# Patient Record
Sex: MALE | Race: WHITE | NOT HISPANIC OR LATINO
[De-identification: names, ages, dates, MRNs, and addresses within clinical notes are randomized per-mention and may not be internally consistent; named-entity substitution may affect disease eponyms.]

---

## 2019-08-19 ENCOUNTER — APPOINTMENT (OUTPATIENT)
Dept: CARDIOLOGY | Facility: CLINIC | Age: 49
End: 2019-08-19
Payer: MEDICARE

## 2019-08-19 ENCOUNTER — NON-APPOINTMENT (OUTPATIENT)
Age: 49
End: 2019-08-19

## 2019-08-19 VITALS
HEART RATE: 81 BPM | HEIGHT: 67 IN | OXYGEN SATURATION: 95 % | TEMPERATURE: 98.7 F | DIASTOLIC BLOOD PRESSURE: 67 MMHG | BODY MASS INDEX: 26.68 KG/M2 | WEIGHT: 170 LBS | RESPIRATION RATE: 12 BRPM | SYSTOLIC BLOOD PRESSURE: 118 MMHG

## 2019-08-19 DIAGNOSIS — N40.0 BENIGN PROSTATIC HYPERPLASIA WITHOUT LOWER URINARY TRACT SYMPMS: ICD-10-CM

## 2019-08-19 DIAGNOSIS — H40.9 UNSPECIFIED GLAUCOMA: ICD-10-CM

## 2019-08-19 PROCEDURE — 93000 ELECTROCARDIOGRAM COMPLETE: CPT

## 2019-08-19 PROCEDURE — 99204 OFFICE O/P NEW MOD 45 MIN: CPT

## 2019-08-19 RX ORDER — PILOCARPINE HYDROCHLORIDE OPHTHALMIC SOLUTION 40 MG/ML
4 SOLUTION/ DROPS OPHTHALMIC
Refills: 0 | Status: ACTIVE | COMMUNITY

## 2019-08-19 RX ORDER — ARTIFICIAL TEARS 1; 2; 3 MG/ML; MG/ML; MG/ML
SOLUTION/ DROPS OPHTHALMIC
Refills: 0 | Status: ACTIVE | COMMUNITY

## 2019-08-19 RX ORDER — TERAZOSIN HCL 1 MG
1 TABLET ORAL
Refills: 0 | Status: ACTIVE | COMMUNITY

## 2019-08-19 RX ORDER — LISINOPRIL 40 MG/1
40 TABLET ORAL
Refills: 0 | Status: ACTIVE | COMMUNITY

## 2019-08-19 RX ORDER — DORZOLAMIDE HYDROCHLORIDE TIMOLOL MALEATE 20; 5 MG/ML; MG/ML
22.3-6.8 SOLUTION/ DROPS OPHTHALMIC
Refills: 0 | Status: ACTIVE | COMMUNITY

## 2019-08-19 RX ORDER — SIMVASTATIN 40 MG/1
40 TABLET, FILM COATED ORAL
Refills: 0 | Status: ACTIVE | COMMUNITY

## 2019-08-19 RX ORDER — LATANOPROST/PF 0.005 %
0.01 DROPS OPHTHALMIC (EYE)
Refills: 0 | Status: ACTIVE | COMMUNITY

## 2019-08-19 RX ORDER — CHOLECALCIFEROL (VITAMIN D3) 1250 MCG
1.25 MG CAPSULE ORAL
Refills: 0 | Status: ACTIVE | COMMUNITY

## 2019-08-19 RX ORDER — METFORMIN HYDROCHLORIDE 500 MG/1
500 TABLET, COATED ORAL
Refills: 0 | Status: ACTIVE | COMMUNITY

## 2019-08-19 NOTE — HISTORY OF PRESENT ILLNESS
[FreeTextEntry1] : Joselyn is an 78-year-old gentleman from [place] here today with son for evaluation of dizziness. He has cochlear implant, glaucome, DM, htn, hyperlipidemia, BPH who started having dizziness before he went to [place] for vacation. He went to MD there and told to be hypertensive. Medications were not changed as far as son knows. Recently started seeing Dr. Silva.

## 2019-08-19 NOTE — DISCUSSION/SUMMARY
[___ Week(s)] : [unfilled] week(s) [FreeTextEntry1] : The patient is an 78-year-old gentleman from [place], Diabetes mellitus, hypertension, hyperlipidemia, BPH, glaucoma with dizziness.\par #1 CV- no angina or heart failure, ECHO ordered\par #2 Htn- BP too low, taper off HCTZ, continue lisinopril\par #3 Lipids- on atorvastatin\par #4 DM- on metformin, obtain labs\par #5 BPH- no change to terazosin\par #6 ENT- cochlear implant for many years\par #7 Glaucoma- meds have been stable, consider holter to evaluate for bradycardia on timolol, however, HR normal today\par #8 General- anxiety may be playing a role, sounds like he has white coat hypertension as well

## 2019-08-19 NOTE — PHYSICAL EXAM
[General Appearance - Well Developed] : well developed [Normal Appearance] : normal appearance [Well Groomed] : well groomed [General Appearance - Well Nourished] : well nourished [No Deformities] : no deformities [General Appearance - In No Acute Distress] : no acute distress [Eyelids - No Xanthelasma] : the eyelids demonstrated no xanthelasmas [Normal Conjunctiva] : the conjunctiva exhibited no abnormalities [Normal Oral Mucosa] : normal oral mucosa [No Oral Pallor] : no oral pallor [No Oral Cyanosis] : no oral cyanosis [Normal Jugular Venous A Waves Present] : normal jugular venous A waves present [Normal Jugular Venous V Waves Present] : normal jugular venous V waves present [Heart Rate And Rhythm] : heart rate and rhythm were normal [No Jugular Venous Mathews A Waves] : no jugular venous mathews A waves [Heart Sounds] : normal S1 and S2 [Murmurs] : no murmurs present [Respiration, Rhythm And Depth] : normal respiratory rhythm and effort [Exaggerated Use Of Accessory Muscles For Inspiration] : no accessory muscle use [Auscultation Breath Sounds / Voice Sounds] : lungs were clear to auscultation bilaterally [Abdomen Soft] : soft [Abdomen Tenderness] : non-tender [Abnormal Walk] : normal gait [Abdomen Mass (___ Cm)] : no abdominal mass palpated [Gait - Sufficient For Exercise Testing] : the gait was sufficient for exercise testing [Nail Clubbing] : no clubbing of the fingernails [Cyanosis, Localized] : no localized cyanosis [Petechial Hemorrhages (___cm)] : no petechial hemorrhages [Skin Color & Pigmentation] : normal skin color and pigmentation [] : no rash [No Venous Stasis] : no venous stasis [No Skin Ulcers] : no skin ulcer [Skin Lesions] : no skin lesions [Oriented To Time, Place, And Person] : oriented to person, place, and time [No Xanthoma] : no  xanthoma was observed [Affect] : the affect was normal [Mood] : the mood was normal [No Anxiety] : not feeling anxious

## 2019-08-19 NOTE — REVIEW OF SYSTEMS
[Shortness Of Breath] : shortness of breath [Chest Pain] : chest pain [Dyspnea on exertion] : dyspnea during exertion [Lower Ext Edema] : lower extremity edema [Palpitations] : palpitations [Negative] : Heme/Lymph

## 2019-09-05 ENCOUNTER — APPOINTMENT (OUTPATIENT)
Dept: CARDIOLOGY | Facility: CLINIC | Age: 49
End: 2019-09-05
Payer: MEDICARE

## 2019-09-05 VITALS
HEART RATE: 69 BPM | HEIGHT: 67 IN | DIASTOLIC BLOOD PRESSURE: 79 MMHG | OXYGEN SATURATION: 98 % | TEMPERATURE: 97.9 F | RESPIRATION RATE: 12 BRPM | SYSTOLIC BLOOD PRESSURE: 148 MMHG

## 2019-09-05 PROCEDURE — 93306 TTE W/DOPPLER COMPLETE: CPT

## 2019-09-05 PROCEDURE — 99214 OFFICE O/P EST MOD 30 MIN: CPT

## 2019-09-05 RX ORDER — HYDROCHLOROTHIAZIDE 25 MG/1
25 TABLET ORAL
Refills: 0 | Status: DISCONTINUED | COMMUNITY
End: 2019-09-05

## 2019-09-05 NOTE — DISCUSSION/SUMMARY
[___ Month(s)] : [unfilled] month(s) [FreeTextEntry1] : The patient is an 78-year-old gentleman from [place], Diabetes mellitus, hypertension, hyperlipidemia, BPH, glaucoma with dizziness that improved off HCTZ.\par #1 CV- no angina or heart failure, ECHO results pending\par #2 Htn- continue lisinopril only\par #3 Lipids- on atorvastatin\par #4 DM- on metformin, obtain labs\par #5 BPH- no change to terazosin\par #6 ENT- cochlear implant for many years\par #7 Glaucoma- meds have been stable\par #8 General- anxiety may be playing a role, sounds like he has white coat hypertension as well

## 2019-09-05 NOTE — HISTORY OF PRESENT ILLNESS
[FreeTextEntry1] : Joselyn is an 78-year-old gentleman from [place] here today with son for evaluation of dizziness. He has cochlear implant, glaucoma, DM, htn, hyperlipidemia, BPH. On questioning, feels better with less dizziness off HCTZ. Only complain is insomnia.

## 2019-09-05 NOTE — PHYSICAL EXAM
[General Appearance - Well Developed] : well developed [Normal Appearance] : normal appearance [Well Groomed] : well groomed [General Appearance - Well Nourished] : well nourished [No Deformities] : no deformities [General Appearance - In No Acute Distress] : no acute distress [Normal Conjunctiva] : the conjunctiva exhibited no abnormalities [Eyelids - No Xanthelasma] : the eyelids demonstrated no xanthelasmas [Normal Oral Mucosa] : normal oral mucosa [No Oral Pallor] : no oral pallor [No Oral Cyanosis] : no oral cyanosis [Normal Jugular Venous A Waves Present] : normal jugular venous A waves present [No Jugular Venous Mathews A Waves] : no jugular venous mathews A waves [Normal Jugular Venous V Waves Present] : normal jugular venous V waves present [Respiration, Rhythm And Depth] : normal respiratory rhythm and effort [Exaggerated Use Of Accessory Muscles For Inspiration] : no accessory muscle use [Auscultation Breath Sounds / Voice Sounds] : lungs were clear to auscultation bilaterally [Heart Rate And Rhythm] : heart rate and rhythm were normal [Heart Sounds] : normal S1 and S2 [Murmurs] : no murmurs present [Abdomen Soft] : soft [Abdomen Tenderness] : non-tender [Abdomen Mass (___ Cm)] : no abdominal mass palpated [Abnormal Walk] : normal gait [Gait - Sufficient For Exercise Testing] : the gait was sufficient for exercise testing [Nail Clubbing] : no clubbing of the fingernails [Cyanosis, Localized] : no localized cyanosis [Petechial Hemorrhages (___cm)] : no petechial hemorrhages [Skin Color & Pigmentation] : normal skin color and pigmentation [] : no rash [No Venous Stasis] : no venous stasis [Skin Lesions] : no skin lesions [No Skin Ulcers] : no skin ulcer [No Xanthoma] : no  xanthoma was observed [Oriented To Time, Place, And Person] : oriented to person, place, and time [Affect] : the affect was normal [Mood] : the mood was normal [No Anxiety] : not feeling anxious

## 2019-09-05 NOTE — REVIEW OF SYSTEMS
[Shortness Of Breath] : shortness of breath [Dyspnea on exertion] : dyspnea during exertion [Chest Pain] : chest pain [Lower Ext Edema] : lower extremity edema [Palpitations] : palpitations [Negative] : Heme/Lymph

## 2019-12-05 ENCOUNTER — APPOINTMENT (OUTPATIENT)
Dept: CARDIOLOGY | Facility: CLINIC | Age: 49
End: 2019-12-05

## 2020-01-29 ENCOUNTER — APPOINTMENT (OUTPATIENT)
Dept: CARDIOLOGY | Facility: CLINIC | Age: 50
End: 2020-01-29
Payer: MEDICARE

## 2020-01-29 ENCOUNTER — NON-APPOINTMENT (OUTPATIENT)
Age: 50
End: 2020-01-29

## 2020-01-29 VITALS
RESPIRATION RATE: 14 BRPM | BODY MASS INDEX: 26.21 KG/M2 | HEIGHT: 67 IN | WEIGHT: 167 LBS | SYSTOLIC BLOOD PRESSURE: 166 MMHG | OXYGEN SATURATION: 98 % | HEART RATE: 84 BPM | DIASTOLIC BLOOD PRESSURE: 73 MMHG

## 2020-01-29 VITALS — DIASTOLIC BLOOD PRESSURE: 78 MMHG | SYSTOLIC BLOOD PRESSURE: 130 MMHG

## 2020-01-29 DIAGNOSIS — R42 DIZZINESS AND GIDDINESS: ICD-10-CM

## 2020-01-29 DIAGNOSIS — E11.9 TYPE 2 DIABETES MELLITUS W/OUT COMPLICATIONS: ICD-10-CM

## 2020-01-29 DIAGNOSIS — I10 ESSENTIAL (PRIMARY) HYPERTENSION: ICD-10-CM

## 2020-01-29 DIAGNOSIS — E78.5 HYPERLIPIDEMIA, UNSPECIFIED: ICD-10-CM

## 2020-01-29 PROCEDURE — 99214 OFFICE O/P EST MOD 30 MIN: CPT

## 2020-01-29 RX ORDER — ASPIRIN ENTERIC COATED TABLETS 81 MG 81 MG/1
81 TABLET, DELAYED RELEASE ORAL
Qty: 90 | Refills: 3 | Status: ACTIVE | COMMUNITY
Start: 1900-01-01 | End: 1900-01-01

## 2020-01-29 NOTE — HISTORY OF PRESENT ILLNESS
[FreeTextEntry1] : Joselyn is an 79-year-old gentleman from [place] here today with son. He has cochlear implant, glaucoma, DM, htn, hyperlipidemia, BPH. He continues to have some dizziness but very mild. He is not walking much because of the cold weather. He wants to be on aspirin.

## 2020-01-29 NOTE — PHYSICAL EXAM
[Normal Appearance] : normal appearance [General Appearance - Well Developed] : well developed [General Appearance - Well Nourished] : well nourished [Well Groomed] : well groomed [No Deformities] : no deformities [Normal Conjunctiva] : the conjunctiva exhibited no abnormalities [Eyelids - No Xanthelasma] : the eyelids demonstrated no xanthelasmas [General Appearance - In No Acute Distress] : no acute distress [Normal Oral Mucosa] : normal oral mucosa [No Oral Pallor] : no oral pallor [Normal Jugular Venous A Waves Present] : normal jugular venous A waves present [No Oral Cyanosis] : no oral cyanosis [Normal Jugular Venous V Waves Present] : normal jugular venous V waves present [No Jugular Venous Mathews A Waves] : no jugular venous mathews A waves [Respiration, Rhythm And Depth] : normal respiratory rhythm and effort [Exaggerated Use Of Accessory Muscles For Inspiration] : no accessory muscle use [Heart Rate And Rhythm] : heart rate and rhythm were normal [Auscultation Breath Sounds / Voice Sounds] : lungs were clear to auscultation bilaterally [Heart Sounds] : normal S1 and S2 [Murmurs] : no murmurs present [Abdomen Soft] : soft [Abdomen Tenderness] : non-tender [Abdomen Mass (___ Cm)] : no abdominal mass palpated [Gait - Sufficient For Exercise Testing] : the gait was sufficient for exercise testing [Abnormal Walk] : normal gait [Nail Clubbing] : no clubbing of the fingernails [Cyanosis, Localized] : no localized cyanosis [Petechial Hemorrhages (___cm)] : no petechial hemorrhages [Skin Color & Pigmentation] : normal skin color and pigmentation [No Venous Stasis] : no venous stasis [] : no rash [Skin Lesions] : no skin lesions [No Xanthoma] : no  xanthoma was observed [No Skin Ulcers] : no skin ulcer [Oriented To Time, Place, And Person] : oriented to person, place, and time [Affect] : the affect was normal [Mood] : the mood was normal [No Anxiety] : not feeling anxious

## 2020-01-29 NOTE — REVIEW OF SYSTEMS
[Dyspnea on exertion] : dyspnea during exertion [Negative] : Heme/Lymph [Shortness Of Breath] : no shortness of breath [Chest Pain] : no chest pain [Palpitations] : no palpitations [Lower Ext Edema] : no extremity edema

## 2020-01-29 NOTE — DISCUSSION/SUMMARY
[___ Month(s)] : [unfilled] month(s) [FreeTextEntry1] : The patient is an 79-year-old gentleman from [place], Diabetes mellitus, hypertension, hyperlipidemia, BPH, glaucoma who is deconditioned.\par #1 CV- no angina or heart failure, ECHO LV[facility]\par #2 Htn- continue lisinopril only\par #3 Lipids- on atorvastatin\par #4 DM- on metformin, obtain labs\par #5 BPH- no change to terazosin\par #6 ENT- cochlear implant for many years\par #7 Glaucoma- meds have been stable\par #8 General- deconditioned, discussed walking once an hour in the house

## 2020-06-29 ENCOUNTER — APPOINTMENT (OUTPATIENT)
Dept: CARDIOLOGY | Facility: CLINIC | Age: 50
End: 2020-06-29

## 2021-01-07 ENCOUNTER — EMERGENCY (EMERGENCY)
Facility: HOSPITAL | Age: 51
LOS: 1 days | Discharge: HOME | End: 2021-01-07
Attending: EMERGENCY MEDICINE
Payer: COMMERCIAL

## 2021-01-07 VITALS
HEART RATE: 111 BPM | SYSTOLIC BLOOD PRESSURE: 117 MMHG | DIASTOLIC BLOOD PRESSURE: 82 MMHG | OXYGEN SATURATION: 94 % | RESPIRATION RATE: 18 BRPM | TEMPERATURE: 100 F

## 2021-01-07 VITALS
RESPIRATION RATE: 18 BRPM | OXYGEN SATURATION: 95 % | DIASTOLIC BLOOD PRESSURE: 76 MMHG | HEART RATE: 98 BPM | SYSTOLIC BLOOD PRESSURE: 124 MMHG

## 2021-01-07 LAB — SARS-COV-2 RNA SPEC QL NAA+PROBE: DETECTED

## 2021-01-07 PROCEDURE — 93010 ELECTROCARDIOGRAM REPORT: CPT

## 2021-01-07 PROCEDURE — U0005: CPT

## 2021-01-07 PROCEDURE — 99284 EMERGENCY DEPT VISIT MOD MDM: CPT

## 2021-01-07 PROCEDURE — 87635 SARS-COV-2 COVID-19 AMP PRB: CPT

## 2021-01-07 PROCEDURE — 93005 ELECTROCARDIOGRAM TRACING: CPT

## 2021-01-07 PROCEDURE — 99283 EMERGENCY DEPT VISIT LOW MDM: CPT

## 2021-01-07 RX ORDER — SODIUM CHLORIDE 9 MG/ML
1000 INJECTION INTRAMUSCULAR; INTRAVENOUS; SUBCUTANEOUS ONCE
Refills: 0 | Status: DISCONTINUED | OUTPATIENT
Start: 2021-01-07 | End: 2021-01-07

## 2021-01-07 RX ORDER — IBUPROFEN 200 MG
400 TABLET ORAL ONCE
Refills: 0 | Status: COMPLETED | OUTPATIENT
Start: 2021-01-07 | End: 2021-01-07

## 2021-01-07 RX ORDER — ACETAMINOPHEN 500 MG
650 TABLET ORAL ONCE
Refills: 0 | Status: COMPLETED | OUTPATIENT
Start: 2021-01-07 | End: 2021-01-07

## 2021-01-07 RX ADMIN — Medication 100 MILLIGRAM(S): at 16:05

## 2021-01-07 RX ADMIN — Medication 650 MILLIGRAM(S): at 16:00

## 2021-01-07 RX ADMIN — Medication 400 MILLIGRAM(S): at 17:10

## 2021-01-07 NOTE — ED ADULT NURSE NOTE - OBJECTIVE STATEMENT
49 yo m no pertinent pmhx presents to the ED with c/o fevers, chills, SOB, +covid parents. PT reports he lives at home with his parents and both are covid positive. Reports he has been experiencing fevers and chills which then progressed to SOB. PT states he noticed the fevers and chills started a couple of days ago, has been experiencing myalgia as well as fevers and chills, increased coughing, and noticed DEAN. Pt denies headache, dizziness, chest pain, palpitations,  abdominal pain, n/v/d, urinary symptoms 51 yo m no pertinent pmhx presents to the ED with c/o fevers, chills, SOB, +covid parents. PT reports he lives at home with his parents and both are covid positive. Reports he has been experiencing fevers and chills which then progressed to SOB. PT states he noticed the fevers and chills started a couple of days ago, has been experiencing myalgia as well as fevers and chills, increased coughing, and noticed DEAN, pt also reports Gi disturbances, reports diarrhea.

## 2021-01-07 NOTE — ED PROVIDER NOTE - OBJECTIVE STATEMENT
51 yo M w/ no reported PMH w/ multiple cohabitants currently COVID+, presenting for 10 days of cough, congestion, fevers (max 102), and SOB. Associated intermittent diarrhea and decreased PO intake. Denies CP, syncope/lightheadedness, N/V, abd pain, changes in urination, and blood in stool. No hx of smoking. Pt denies regular medication use. Had Tylenol today AM w/ mild relief. 51 yo M w/ no reported PMH w/ multiple cohabitants currently COVID+, presenting for 10 days of cough, congestion, fevers (max 102), and SOB. Associated intermittent diarrhea and decreased PO intake. Denies sob, CP, syncope/lightheadedness, N/V, abd pain, changes in urination, and blood in stool. No hx of smoking. Pt denies regular medication use. Had Tylenol today AM w/ mild relief.

## 2021-01-07 NOTE — ED PROVIDER NOTE - PATIENT PORTAL LINK FT
You can access the FollowMyHealth Patient Portal offered by [facility] by registering at the following website: [email or web address]. By joining [employer]’s FollowMyHealth portal, you will also be able to view your health information using other applications (apps) compatible with our system.

## 2021-01-07 NOTE — ED PROVIDER NOTE - PHYSICAL EXAMINATION
Gen: A&Ox4.   HEENT: Atraumatic. No pharyngeal erythema or exudates. Mucous membranes moist, no scleral icterus.   CV: Intermittently tachycardic. No M/R/G. No significant LE edema. Distal pulses intact. Capillary refill < 2 seconds.  Resp: Normal effort. CTAB, no rales, rhonchi, or wheezes.  GI: Abdomen non tender to palpation, soft and non-distended. No masses appreciated. + BS.  MSK: No open wounds. No ecchymosis appreciated.  Neuro: Following commands. Moving extremities spontaneously.  Psych: Appropriate mood, cooperative

## 2021-01-07 NOTE — ED PROVIDER NOTE - PROGRESS NOTE DETAILS
Feels better. HR improved. Medicated for fever. +COVID, supportive care, low risk. F/u with pmd. Return precautions discussed

## 2021-01-07 NOTE — ED PROVIDER NOTE - CLINICAL SUMMARY MEDICAL DECISION MAKING FREE TEXT BOX
49 yo M w/ no reported PMH presenting w/ URI symptoms and multiple sick contacts at home COVID+. Denies CP. VSS. 96% on RA and intermittently tachycardic. Physical exam w/o concerning findings. ECG evaluated by attending and w/o concerning findings. Likely COVID related illness/URI, tachycardia 2/2 to fever. COVID testing. Tylenol for fever. Plan for d/c home w/ close f/u. Tolerating PO. Will reassess. 51 yo M w/ no reported PMH presenting w/ URI symptoms and multiple sick contacts at home COVID+. Denies CP. VSS. 96% on RA and intermittently tachycardic. Physical exam w/o concerning findings. ECG evaluated by attending and w/o concerning findings. Likely COVID related illness/URI, tachycardia 2/2 to fever. COVID testing. Tylenol for fever. Plan for d/c home w/ close f/u. Tolerating PO. Will reassess.    Aletha Mosher MD - Attending Physician: Pt here with fever, URI symptoms. Lives with multiple +COVID contacts. No CP, no diff breathing, no SOB. No hypoxia. Well appearing, lungs clear, comfortable. Supportive care, f/u outpatient

## 2021-01-07 NOTE — ED PROVIDER NOTE - NSFOLLOWUPINSTRUCTIONS_ED_ALL_ED_FT
1. You presented to the emergency department for:  cough, fevers, and SOB    2. Your evaluation in the emergency department included a physician evaluation and testing consisting of: COVID testing. These results have not been completed yet, and you will called regarding them.    3. It is recommended that you follow-up with your primary care doctor tomorrow as discussed for a repeat evaluation, and potentially further testing and treatment. The contact information to arrange an appointment is available in your paper work.    If needed, to arrange an appointment with a primary care provider please call: [phone]    4. Please continue taking your regular medications as prescribed.     For pain and  ***** you may take 500-1000mg Tylenol every 6 hours - as needed.  This is an over-the-counter medication - please respect the warnings on the label. This medication comes with certain risks and side effects that you need to discuss with your doctor, especially if you are taking it for a prolonged period of time.    5. PLEASE RETURN TO THE EMERGENCY DEPARTMENT IMMEDIATELY IF you have any fevers, uncontrollable nausea and vomiting, lightheadedness, inability to tolerate eating and drinking, difficulty breathing, severe increase in symptoms or pain, or an other new symptoms that concern you. 1. You presented to the emergency department for:  cough, fevers, and SOB    2. Your evaluation in the emergency department included a physician evaluation and testing consisting of: COVID testing. These results have not been completed yet, and you will called regarding them.    3. It is recommended that you follow-up with your primary care doctor tomorrow as discussed for a repeat evaluation, and potentially further testing and treatment.     If needed, to arrange an appointment with a primary care provider please call: [phone]    4. Please continue taking your regular medications as prescribed.     For pain or fevers you may take 500-1000mg Tylenol every 6 hours - as needed.  This is an over-the-counter medication - please respect the warnings on the label. This medication comes with certain risks and side effects that you need to discuss with your doctor, especially if you are taking it for a prolonged period of time.    5. PLEASE RETURN TO THE EMERGENCY DEPARTMENT IMMEDIATELY IF you have any fevers, increase in difficulty breathing, uncontrollable nausea and vomiting, lightheadedness, inability to tolerate eating and drinking, difficulty breathing, severe increase in symptoms or pain, or an other new symptoms that concern you.    COVID-19 (Coronavirus Disease 2019)    WHAT YOU NEED TO KNOW:    COVID-19 is the disease caused by a coronavirus first discovered in 2019. Coronaviruses generally cause upper respiratory (nose, throat, and lung) infections, such as a cold. The new virus can also cause serious lower respiratory conditions, such as pneumonia or acute respiratory distress syndrome (ARDS). The new virus can also affect many other organs, including the brain and heart. Blood vessels can also be affected, leading to blood clots. Anyone can develop serious problems from the new virus, but your risk is higher if you are 65 or older. A weak immune system, diabetes, or a heart or lung condition can also increase your risk. Your risk is also higher if you are a current or former cigarette smoker.    DISCHARGE INSTRUCTIONS:    If you think you or someone you know may be infected: Do the following to protect others:     - If emergency care is needed, tell the  about the possible infection, or call ahead and tell the emergency department.    - Call a healthcare provider for instructions if symptoms are mild. Anyone who may be infected should not arrive without calling first. The provider will need to protect staff members and other patients.    - The person who may be infected needs to wear a face covering while getting medical care. This will help lower the risk of infecting others. Coverings are not used for anyone who is younger than 2 years, has breathing problems, or cannot remove it. The provider can give you instructions for anyone who cannot wear a covering.      Call your local emergency number (911 in the [place]) or an emergency department if:     - You have trouble breathing or shortness of breath at rest.    - You have chest pain or pressure that lasts longer than 5 minutes.    - You become confused or hard to wake.    - Your lips or face are blue.    - You have a fever of 104°F (40°C) or higher.      Call your doctor if:     - You do not have symptoms of COVID-19 but had close physical contact within 14 days with someone who tested positive.    - You have questions or concerns about your condition or care.        Medicines: You may need any of the following for mild symptoms:     - Decongestants help reduce nasal congestion and help you breathe more easily. If you take decongestant pills, they may make you feel restless or cause problems with your sleep. Do not use decongestant sprays for more than a few days.    - Cough suppressants help reduce coughing. Ask your healthcare provider which type of cough medicine is best for you.    - Acetaminophen decreases pain and fever. It is available without a doctor's order. Ask how much to take and how often to take it. Follow directions. Read the labels of all other medicines you are using to see if they also contain acetaminophen, or ask your doctor or pharmacist. Acetaminophen can cause liver damage if not taken correctly. Do not use more than 4 grams (4,000 milligrams) total of acetaminophen in one day.     - NSAIDs, such as ibuprofen, help decrease swelling, pain, and fever. NSAIDs can cause stomach bleeding or kidney problems in certain people. If you take blood thinner medicine, always ask your healthcare provider if NSAIDs are safe for you. Always read the medicine label and follow directions.    - Take your medicine as directed. Contact your healthcare provider if you think your medicine is not helping or if you have side effects. Tell him or her if you are allergic to any medicine. Keep a list of the medicines, vitamins, and herbs you take. Include the amounts, and when and why you take them. Bring the list or the pill bottles to follow-up visits. Carry your medicine list with you in case of an emergency.        How the 2019 coronavirus spreads: The virus spreads quickly and easily. You can become infected if you are in contact with a large amount of the virus, even for a short time. You can also become infected by being around a small amount of virus for a long time. The following are ways the virus is thought to spread, but more information may be coming:     - Droplets are the most common way all coronaviruses spread. The virus can travel in droplets that form when a person talks, coughs, or sneezes. Anyone who breathes in the droplets or gets them in his or her eyes can become infected with the virus. Droplets can stay in the air for a few hours and travel farther than 6 feet (2 meters). A person can have contact with the droplets, even after the infected person leaves the room. This is called airborne transmission, a less common way the virus can spread. It has mainly happened in closed rooms that do not have flowing air.    - Close personal contact with an infected person increases the risk for infection. Close personal contact means you are within 6 feet (2 meters) of the person. The virus can be passed starting 2 days before symptoms begin. The virus can be passed even if the person never develops symptoms, starting 2 days before a positive test. Infection can happen from close contact for a total of 15 minutes or more over 24 hours. An example is close contact 3 times for 5 minutes each within 24 hours. Some factors make infection more likely. These include how close you are and for how long. Your risk is higher if you are indoors and no air is circulating. The risk is even higher if both of you are not wearing face coverings.    - Person-to-person contact can spread the virus. For example, a person with the virus on his or her hands can spread it by shaking hands with someone. Some newborns have tested positive for the virus. It is not known for sure if the newborns were infected during pregnancy or after they were born. The greatest risk is for a  to be in close contact with an infected person. The virus also does not appear to spread in breast milk. If you are pregnant or breastfeeding, talk to your healthcare provider or obstetrician about any concerns you have.    - The virus can stay on objects and surfaces. A person can get the virus on his or her hands by touching the object or surface. Infection happens if the person then touches his or her eyes or mouth with unwashed hands. It is not yet known how long the virus can stay on an object or surface. That is why it is important to clean all surfaces that are used regularly.    - An infected animal may be able to infect a person who touches it. This may happen at live markets or on a farm.        How everyone can lower the risk for COVID-19: The best way to prevent infection is to avoid anyone who is infected, but this can be hard to do. An infected person can spread the virus before signs or symptoms begin, or even if signs or symptoms never develop. The following can help lower the risk for infection:     Limit the Spread of Infectious Disease:     - Wash your hands often throughout the day. Use soap and water. Rub your soapy hands together, lacing your fingers. Wash the front and back of each hand, and in between your fingers. Use the fingers of one hand to scrub under the fingernails of the other hand. Wash for at least 20 seconds. Rinse with warm, running water for several seconds. Then dry your hands with a clean towel or paper towel. Use hand  that contains alcohol if soap and water are not available. Do not touch your eyes, nose, or mouth without washing your hands first. Teach children how to wash their hands and use hand .  Handwashing     - Cover a sneeze or cough. This prevents droplets from traveling from you to others. Turn your face away and cover your mouth and nose with a tissue. Throw the tissue away. Use the bend of your arm if a tissue is not available. Then wash your hands well with soap and water or use hand . Turn and cover your face if you are around someone who is sneezing or coughing. Teach children how to cover a cough or sneeze.  - Follow worldwide, national, and local social distancing guidelines. Social distancing means people avoid close physical contact so the virus cannot spread from one person to another. Keep at least 6 feet (2 meters) between you and others. Also keep this distance from anyone who comes to your home, such as someone making a delivery.    - Make a habit of not touching your face. It is not known how long the virus can stay on objects and surfaces. If you get the virus on your hands, you can transfer it to your eyes, nose, or mouth and become infected. You can also transfer it to objects, surfaces, or people. Be aware of what you touch when you go out. Examples include handrails and elevator buttons. Try not to touch anything with bare hands unless it is necessary. Wash your hands before you leave your home and when you return.    - Clean and disinfect high-touch surfaces and objects often. Use a disinfecting solution or wipes. You can make a solution by diluting 4 teaspoons of bleach in 1 quart (4 cups) of water. Clean and disinfect even if you think no one living in or coming to your home is infected with the virus. You can wipe items with a disinfecting cloth before you bring them into your home. Wash your hands after you handle anything you bring into your home.    - Ask about vaccines you may need. No COVID-19 vaccine is currently available. A vaccine is under investigation for use as active immunization against COVID-19 in adults. Your healthcare provider can give you more information about when a vaccine may be available to you. In the meantime, other vaccines can help your immune system fight infections. Get the influenza (flu) vaccine as soon as recommended each year, usually starting in September or October. Get the pneumonia vaccine if recommended. Your healthcare provider can tell you if you also need other vaccines, and when to get them.        Social distancing guidelines: National and local social distancing rules vary. Rules may change over time as restrictions are lifted. Restrictions may return if an outbreak happens where you live. It is important to know and follow all current social distancing rules in your area. The following are general guidelines:    - Stay home if you are sick or think you may have COVID-19. It is important to stay home if you are waiting for a testing appointment or for test results. Even if you do not have symptoms, you can pass the virus to others.    - Limit trips out of your home. You may be able to have food, medicines, and other supplies delivered. If possible, have delivered items left at your door or other area. Try not to have someone hand you an item. You will be so close to the person that the virus can spread between you. Plan trips out of your home. Think about how many people you may have contact with, and for how long. Plan your route so you make the fewest stops possible to limit contact. Keep track of places you go and anyone you have contact with. This will help contact tracers notify others if you become infected.    - Do not have close physical contact with anyone who does not live in your home. Do not shake hands with, hug, or kiss a person as a greeting. Stand or walk as far from others as possible. If you must use public transportation (such as a bus or subway), try to sit or stand away from others. You can stay safely connected with others through phone calls, e-mail messages, social media websites, and video chats. Check in on anyone who may be having a hard time socially distancing, or who lives alone. Ask administrators at nursing homes or long-term care facilities how you can safely communicate with someone living there.    - Wear a face covering (mask) around anyone who does not live in your home. A covering helps protect the person wearing it from being infected or passing the virus to others. Do not wear a plastic face shield instead of a covering. You can use both together for extra protection. Use a disposable non-medical mask, or make a cloth covering with at least 2 layers. Cover your mouth and your nose. Securely fasten it under your chin and on the sides of your face. A face covering is not a substitute for other safety measures. Continue social distancing and washing your hands often. Do not put coverings on children younger than 2 years or on anyone who has breathing problems or cannot remove it. Talk to your healthcare provider if you or someone you care for cannot wear a face covering.    - Only allow medical professionals or other necessary helpers into your home. Wear your face covering, and remind others to wear a face covering. Remind them to wash their hands when they arrive and before they leave. Do not let a visitor into your home, even if the person is not sick. A person can pass the virus to others before symptoms of COVID-19 begin. Some people never even develop symptoms. Children commonly have mild symptoms or no symptoms. It may be hard to tell a child not to hug or kiss you. Explain that this is how he or she can help you stay healthy.    - Do not go to someone else's home unless it is necessary. Do not go over to visit, even if the person is lonely. Only go if you need to help him or her. Make sure you both wear face coverings while you are there.    - Avoid large gatherings and crowds. Gatherings or crowds of 10 or more individuals can cause the virus to spread. Examples of gatherings include parties, holiday meals, sporting events, [employer] orthodox services, and conferences. Crowds may form at beaches, murray, and tourist attractions. Protect yourself by staying away from large gatherings and crowds.    - Ask your healthcare provider for other ways to have appointments. You may be able to have appointments without having to go into the provider's office. Some providers offer phone, video, or other types of appointments. You may also be able to get prescriptions for a few months of your medicines at a time.    - Stay safe if you must go out to work. You may have a job that can only be done outside your home. Keep physical distance between you and other workers as much as possible. Follow your employer's rules so everyone stays safe.        If you have COVID-19 and are recovering at home: Healthcare providers will give you specific instructions to follow. The following are general guidelines to remind you how to keep others safe until you are well:     - Wash your hands often. Use soap and water as much as possible. You can use hand  that contains alcohol if soap and water are not available. Do not share towels with anyone. If you use paper towels, throw them away in a lined trash can kept in your room or area. Use a covered trash can, if possible.    - Do not go out of your home unless it is necessary. You may have to go to your healthcare provider's office for check-ups or to get prescription refills. Do not arrive at the provider's office without an appointment. Providers have to make their offices safe for staff and other patients. If possible, ask someone who is not infected to go out for what you need. This includes groceries, medicines, and household items.    - Only have close physical contact with a person giving direct care, or a baby or child you must care for. Family members and friends should not visit you. If possible, stay in a separate area or room of your home if you live with others. No one should go into the area or room except to give you care. You can visit with others by phone, video chat, e-mail, or similar systems. It is important to stay connected with others in your life while you recover.    - Wear a face covering while others are near you. This can help prevent droplets from spreading the virus when you talk, sneeze, or cough. Put the covering on before anyone comes into your room or area. Remind the person to cover his or her nose and mouth before going in to provide care for you.    - Do not share items. Do not share dishes, towels, or other items with anyone. Items need to be washed after you use them.    - Protect your baby. Wash your hands with soap and water often throughout the day. Wear a clean face covering while you breastfeed, or while you express or pump breast milk. If possible, ask someone who is well to care for your baby. You can put breast milk in bottles for the person to use, if needed. Talk to your healthcare provider if you have any questions or concerns about caring for or bonding with your baby. He or she will tell you when to bring your baby in for check-ups and vaccines. He or she will also tell you what to do if you think your baby was infected with the new virus.    - Do not handle live animals unless it is necessary. Until more is known, it is best not to touch, play with, or handle live animals. Some animals, including pets, have been infected with the new coronavirus. Do not handle or care for animals until you are well. Care includes feeding, petting, and cuddling your pet. Do not let your pet lick you or share your food. Ask someone who is not infected to take care of your pet, if possible. If you must care for a pet, wear a face covering. Wash your hands before and after you give care. Talk to your healthcare provider about how to keep a service animal safe, if needed.    - Follow directions from your healthcare provider for being around others after you recover. It is not known for sure if or for how long a recovered person can pass the virus to others. Your provider may give you instructions, such as continuing social distancing or wearing a face covering around others. The following are general guidelines for when you can be around others:?If you never developed any symptoms, wait at least 10 days after your positive test. Your provider may want you to have 2 negative tests in a row at least 24 hours apart. This depends on how available testing is in your area.    ?If you did have symptoms, wait at least 10 days after the symptoms first appeared. Then you will need to have no fever for 24 hours without fever medicine. Most of your symptoms will also need to be gone. A loss of taste or smell may continue for several months. It is considered okay to be around others if this is your only symptom.    - If you were hospitalized for COVID-19 and needed oxygen, your provider will tell you how long to wait. You may need to wait until 20 days after symptoms appeared. It may be less if you have 2 negative tests in a row at least 24 hours apart. This will depend on how available testing is in your area.        How to take care of someone who has COVID-19: If the person lives in another home, arrange for a time to give care. Remember to bring a few pairs of disposable gloves and a cloth face covering. The following are general guidelines to help you safely care for anyone who has COVID-19:    - Wash your hands often. Wash before and after you go into the person's home, area, or room. Throw paper towels away in a lined trash can that has a lid, if possible.    - Help the person so he or she does not expose others to the virus. You will also help the person rest by doing housework, cooking meals, and running errands for him or her. Go out for groceries, medicines, or household items the person needs. Make sure he or she drinks extra liquids and eats healthy foods. Watch for worsening symptoms. Keep his or her healthcare provider's contact information where you can easily find it. Contact the provider if you notice a symptom is getting worse. The provider will tell you what to do.    - Do not allow others to go near the person. No one should come into the person's home unless it is necessary. If possible, the person should be in a separate area or room if he or she lives with others. Keep the room's door shut unless you need to go in or out. Have others call, video chat, or e-mail the person if he or she is feeling well enough. The person may feel lonely if he or she is kept separate for a long period of time. Safe communication can help him or her stay connected to family and friends.    - Make sure the person's room has good air flow. You may be able to open the window if the weather allows. An air conditioner can also be turned on to help air move.    - Contact the person before you go in to give care. Make sure the person is wearing a face covering. Remind him or her to wash his or her hands with soap and water. He or she can use hand  that contains alcohol if soap and water are not available. Put on a face covering before you go in to give care.    - Wear gloves while you give care and clean. Clean items the person uses often. Clean countertops, cooking surfaces, and the fronts and insides of the microwave and refrigerator. Clean the shower, toilet, the area around the toilet, the sink, the area around the sink, and faucets. Gather used laundry or bedding. Wash and dry items on the warmest settings the fabric allows. Wash dishes and silverware in hot, soapy water or in a .    - Anything you throw away needs to go into a lined trash can. When you need to empty the trash, close the open end of the lining and tie it closed. This helps prevent items the virus is on from spilling out of the trash. Remove your gloves and throw them away. Wash your hands.    - Follow your healthcare provider's instructions. You will need to quarantine while you are caring for the infected person. You should also be tested, even if you do not develop symptoms of COVID-19. These measures will help protect others you are around while you are giving care. Your provider will give you instructions for quarantining and testing.      Follow up with your doctor as directed: Write down your questions so you remember to ask them during your visits.    For more information:   - Centers for Disease Control and Prevention  [address] [place] [address]  Phone: [phone]  Web Address: http://www.cdc.gov

## 2021-01-07 NOTE — ED PROVIDER NOTE - NS ED ROS FT
Gen: +fever.   HEENT: Denies headache. + congestion.  CV: Denies chest pain. Denies lightheadedness.  Skin: Denies rash.   Resp: + SOB. + cough.  GI: Denies abd pain. Denies nausea. Denies vomiting. + diarrhea. Denies melena. Denies hematochezia.  Msk: Denies extremity swelling. Denies extremity pain.  : Denies dysuria. Denies hematuria.  Neuro: Denies LOC. Denies dizziness. Denies new numbness/tingling. Denies new focal weakness.  Psych: Denies SI

## 2021-01-07 NOTE — ED ADULT NURSE NOTE - NSIMPLEMENTINTERV_GEN_ALL_ED
Implemented All Universal Safety Interventions:  [place] to call system. Call bell, personal items and telephone within reach. Instruct patient to call for assistance. Room bathroom lighting operational. Non-slip footwear when patient is off stretcher. Physically safe environment: no spills, clutter or unnecessary equipment. Stretcher in lowest position, wheels locked, appropriate side rails in place.

## 2021-01-07 NOTE — ED PROVIDER NOTE - CARE PLAN
Principal Discharge DX:	Upper respiratory tract infection, unspecified type   Principal Discharge DX:	COVID-19

## 2021-01-22 ENCOUNTER — TRANSCRIPTION ENCOUNTER (OUTPATIENT)
Age: 51
End: 2021-01-22

## 2021-10-15 NOTE — ED ADULT TRIAGE NOTE - PAIN: PRESENCE, MLM
----- Message from Andreea Ingram DO sent at 10/15/2021  9:41 AM CDT -----  On 4 mg daily. Recommend increasing to alternating 4 mg and 5 mg. Recheck in 1 week.  
Writer spoke with patient. He really does not want to have to alternate. Spoke with Dr. Ingram and she is okay with patient taking 5 mg daily. Patient is okay with this and would like this dose sent to [facility]. This has been done. Patient scheduled for recheck next week Friday.   
denies pain/discomfort

## 2023-11-06 ENCOUNTER — NON-APPOINTMENT (OUTPATIENT)
Age: 53
End: 2023-11-06

## 2023-11-07 ENCOUNTER — TRANSCRIPTION ENCOUNTER (OUTPATIENT)
Age: 53
End: 2023-11-07

## 2023-11-07 ENCOUNTER — INPATIENT (INPATIENT)
Facility: HOSPITAL | Age: 53
LOS: 1 days | Discharge: HOME | DRG: 580 | End: 2023-11-09
Attending: INTERNAL MEDICINE | Admitting: INTERNAL MEDICINE
Payer: COMMERCIAL

## 2023-11-07 VITALS
WEIGHT: 177.91 LBS | DIASTOLIC BLOOD PRESSURE: 79 MMHG | RESPIRATION RATE: 18 BRPM | SYSTOLIC BLOOD PRESSURE: 141 MMHG | HEIGHT: 65 IN | OXYGEN SATURATION: 97 % | TEMPERATURE: 99 F | HEART RATE: 99 BPM

## 2023-11-07 LAB
ALBUMIN SERPL ELPH-MCNC: 4.2 G/DL — SIGNIFICANT CHANGE UP (ref 3.3–5)
ALBUMIN SERPL ELPH-MCNC: 4.2 G/DL — SIGNIFICANT CHANGE UP (ref 3.3–5)
ALP SERPL-CCNC: 122 U/L — HIGH (ref 40–120)
ALP SERPL-CCNC: 122 U/L — HIGH (ref 40–120)
ALT FLD-CCNC: 33 U/L — SIGNIFICANT CHANGE UP (ref 10–45)
ALT FLD-CCNC: 33 U/L — SIGNIFICANT CHANGE UP (ref 10–45)
ANION GAP SERPL CALC-SCNC: 13 MMOL/L — SIGNIFICANT CHANGE UP (ref 5–17)
ANION GAP SERPL CALC-SCNC: 13 MMOL/L — SIGNIFICANT CHANGE UP (ref 5–17)
AST SERPL-CCNC: 31 U/L — SIGNIFICANT CHANGE UP (ref 10–40)
AST SERPL-CCNC: 31 U/L — SIGNIFICANT CHANGE UP (ref 10–40)
BASE EXCESS BLDV CALC-SCNC: 1.5 MMOL/L — SIGNIFICANT CHANGE UP (ref -2–3)
BASE EXCESS BLDV CALC-SCNC: 1.5 MMOL/L — SIGNIFICANT CHANGE UP (ref -2–3)
BASOPHILS # BLD AUTO: 0.04 K/UL — SIGNIFICANT CHANGE UP (ref 0–0.2)
BASOPHILS # BLD AUTO: 0.04 K/UL — SIGNIFICANT CHANGE UP (ref 0–0.2)
BASOPHILS NFR BLD AUTO: 0.6 % — SIGNIFICANT CHANGE UP (ref 0–2)
BASOPHILS NFR BLD AUTO: 0.6 % — SIGNIFICANT CHANGE UP (ref 0–2)
BILIRUB SERPL-MCNC: 0.3 MG/DL — SIGNIFICANT CHANGE UP (ref 0.2–1.2)
BILIRUB SERPL-MCNC: 0.3 MG/DL — SIGNIFICANT CHANGE UP (ref 0.2–1.2)
BUN SERPL-MCNC: 16 MG/DL — SIGNIFICANT CHANGE UP (ref 7–23)
BUN SERPL-MCNC: 16 MG/DL — SIGNIFICANT CHANGE UP (ref 7–23)
CA-I SERPL-SCNC: 1.23 MMOL/L — SIGNIFICANT CHANGE UP (ref 1.15–1.33)
CA-I SERPL-SCNC: 1.23 MMOL/L — SIGNIFICANT CHANGE UP (ref 1.15–1.33)
CALCIUM SERPL-MCNC: 9.1 MG/DL — SIGNIFICANT CHANGE UP (ref 8.4–10.5)
CALCIUM SERPL-MCNC: 9.1 MG/DL — SIGNIFICANT CHANGE UP (ref 8.4–10.5)
CHLORIDE BLDV-SCNC: 104 MMOL/L — SIGNIFICANT CHANGE UP (ref 96–108)
CHLORIDE BLDV-SCNC: 104 MMOL/L — SIGNIFICANT CHANGE UP (ref 96–108)
CHLORIDE SERPL-SCNC: 104 MMOL/L — SIGNIFICANT CHANGE UP (ref 96–108)
CHLORIDE SERPL-SCNC: 104 MMOL/L — SIGNIFICANT CHANGE UP (ref 96–108)
CO2 BLDV-SCNC: 30 MMOL/L — HIGH (ref 22–26)
CO2 BLDV-SCNC: 30 MMOL/L — HIGH (ref 22–26)
CO2 SERPL-SCNC: 21 MMOL/L — LOW (ref 22–31)
CO2 SERPL-SCNC: 21 MMOL/L — LOW (ref 22–31)
CREAT SERPL-MCNC: 0.76 MG/DL — SIGNIFICANT CHANGE UP (ref 0.5–1.3)
CREAT SERPL-MCNC: 0.76 MG/DL — SIGNIFICANT CHANGE UP (ref 0.5–1.3)
CRP SERPL-MCNC: 17 MG/L — HIGH (ref 0–4)
CRP SERPL-MCNC: 17 MG/L — HIGH (ref 0–4)
EGFR: 106 ML/MIN/1.73M2 — SIGNIFICANT CHANGE UP
EGFR: 106 ML/MIN/1.73M2 — SIGNIFICANT CHANGE UP
EOSINOPHIL # BLD AUTO: 0.27 K/UL — SIGNIFICANT CHANGE UP (ref 0–0.5)
EOSINOPHIL # BLD AUTO: 0.27 K/UL — SIGNIFICANT CHANGE UP (ref 0–0.5)
EOSINOPHIL NFR BLD AUTO: 3.8 % — SIGNIFICANT CHANGE UP (ref 0–6)
EOSINOPHIL NFR BLD AUTO: 3.8 % — SIGNIFICANT CHANGE UP (ref 0–6)
GAS PNL BLDV: 136 MMOL/L — SIGNIFICANT CHANGE UP (ref 136–145)
GAS PNL BLDV: 136 MMOL/L — SIGNIFICANT CHANGE UP (ref 136–145)
GAS PNL BLDV: SIGNIFICANT CHANGE UP
GAS PNL BLDV: SIGNIFICANT CHANGE UP
GLUCOSE BLDV-MCNC: 106 MG/DL — HIGH (ref 70–99)
GLUCOSE BLDV-MCNC: 106 MG/DL — HIGH (ref 70–99)
GLUCOSE SERPL-MCNC: 100 MG/DL — HIGH (ref 70–99)
GLUCOSE SERPL-MCNC: 100 MG/DL — HIGH (ref 70–99)
HCO3 BLDV-SCNC: 28 MMOL/L — SIGNIFICANT CHANGE UP (ref 22–29)
HCO3 BLDV-SCNC: 28 MMOL/L — SIGNIFICANT CHANGE UP (ref 22–29)
HCT VFR BLD CALC: 44.9 % — SIGNIFICANT CHANGE UP (ref 39–50)
HCT VFR BLD CALC: 44.9 % — SIGNIFICANT CHANGE UP (ref 39–50)
HCT VFR BLDA CALC: 46 % — SIGNIFICANT CHANGE UP (ref 39–51)
HCT VFR BLDA CALC: 46 % — SIGNIFICANT CHANGE UP (ref 39–51)
HGB BLD CALC-MCNC: 15.4 G/DL — SIGNIFICANT CHANGE UP (ref 12.6–17.4)
HGB BLD CALC-MCNC: 15.4 G/DL — SIGNIFICANT CHANGE UP (ref 12.6–17.4)
HGB BLD-MCNC: 14.7 G/DL — SIGNIFICANT CHANGE UP (ref 13–17)
HGB BLD-MCNC: 14.7 G/DL — SIGNIFICANT CHANGE UP (ref 13–17)
IMM GRANULOCYTES NFR BLD AUTO: 0.3 % — SIGNIFICANT CHANGE UP (ref 0–0.9)
IMM GRANULOCYTES NFR BLD AUTO: 0.3 % — SIGNIFICANT CHANGE UP (ref 0–0.9)
LACTATE BLDV-MCNC: 1.1 MMOL/L — SIGNIFICANT CHANGE UP (ref 0.5–2)
LACTATE BLDV-MCNC: 1.1 MMOL/L — SIGNIFICANT CHANGE UP (ref 0.5–2)
LYMPHOCYTES # BLD AUTO: 2.15 K/UL — SIGNIFICANT CHANGE UP (ref 1–3.3)
LYMPHOCYTES # BLD AUTO: 2.15 K/UL — SIGNIFICANT CHANGE UP (ref 1–3.3)
LYMPHOCYTES # BLD AUTO: 30.2 % — SIGNIFICANT CHANGE UP (ref 13–44)
LYMPHOCYTES # BLD AUTO: 30.2 % — SIGNIFICANT CHANGE UP (ref 13–44)
MCHC RBC-ENTMCNC: 29.8 PG — SIGNIFICANT CHANGE UP (ref 27–34)
MCHC RBC-ENTMCNC: 29.8 PG — SIGNIFICANT CHANGE UP (ref 27–34)
MCHC RBC-ENTMCNC: 32.7 GM/DL — SIGNIFICANT CHANGE UP (ref 32–36)
MCHC RBC-ENTMCNC: 32.7 GM/DL — SIGNIFICANT CHANGE UP (ref 32–36)
MCV RBC AUTO: 90.9 FL — SIGNIFICANT CHANGE UP (ref 80–100)
MCV RBC AUTO: 90.9 FL — SIGNIFICANT CHANGE UP (ref 80–100)
MONOCYTES # BLD AUTO: 0.71 K/UL — SIGNIFICANT CHANGE UP (ref 0–0.9)
MONOCYTES # BLD AUTO: 0.71 K/UL — SIGNIFICANT CHANGE UP (ref 0–0.9)
MONOCYTES NFR BLD AUTO: 10 % — SIGNIFICANT CHANGE UP (ref 2–14)
MONOCYTES NFR BLD AUTO: 10 % — SIGNIFICANT CHANGE UP (ref 2–14)
NEUTROPHILS # BLD AUTO: 3.93 K/UL — SIGNIFICANT CHANGE UP (ref 1.8–7.4)
NEUTROPHILS # BLD AUTO: 3.93 K/UL — SIGNIFICANT CHANGE UP (ref 1.8–7.4)
NEUTROPHILS NFR BLD AUTO: 55.1 % — SIGNIFICANT CHANGE UP (ref 43–77)
NEUTROPHILS NFR BLD AUTO: 55.1 % — SIGNIFICANT CHANGE UP (ref 43–77)
NRBC # BLD: 0 /100 WBCS — SIGNIFICANT CHANGE UP (ref 0–0)
NRBC # BLD: 0 /100 WBCS — SIGNIFICANT CHANGE UP (ref 0–0)
PCO2 BLDV: 51 MMHG — SIGNIFICANT CHANGE UP (ref 42–55)
PCO2 BLDV: 51 MMHG — SIGNIFICANT CHANGE UP (ref 42–55)
PH BLDV: 7.35 — SIGNIFICANT CHANGE UP (ref 7.32–7.43)
PH BLDV: 7.35 — SIGNIFICANT CHANGE UP (ref 7.32–7.43)
PLATELET # BLD AUTO: 249 K/UL — SIGNIFICANT CHANGE UP (ref 150–400)
PLATELET # BLD AUTO: 249 K/UL — SIGNIFICANT CHANGE UP (ref 150–400)
PO2 BLDV: 41 MMHG — SIGNIFICANT CHANGE UP (ref 25–45)
PO2 BLDV: 41 MMHG — SIGNIFICANT CHANGE UP (ref 25–45)
POTASSIUM BLDV-SCNC: 3.8 MMOL/L — SIGNIFICANT CHANGE UP (ref 3.5–5.1)
POTASSIUM BLDV-SCNC: 3.8 MMOL/L — SIGNIFICANT CHANGE UP (ref 3.5–5.1)
POTASSIUM SERPL-MCNC: 4.6 MMOL/L — SIGNIFICANT CHANGE UP (ref 3.5–5.3)
POTASSIUM SERPL-MCNC: 4.6 MMOL/L — SIGNIFICANT CHANGE UP (ref 3.5–5.3)
POTASSIUM SERPL-SCNC: 4.6 MMOL/L — SIGNIFICANT CHANGE UP (ref 3.5–5.3)
POTASSIUM SERPL-SCNC: 4.6 MMOL/L — SIGNIFICANT CHANGE UP (ref 3.5–5.3)
PROT SERPL-MCNC: 7.7 G/DL — SIGNIFICANT CHANGE UP (ref 6–8.3)
PROT SERPL-MCNC: 7.7 G/DL — SIGNIFICANT CHANGE UP (ref 6–8.3)
RBC # BLD: 4.94 M/UL — SIGNIFICANT CHANGE UP (ref 4.2–5.8)
RBC # BLD: 4.94 M/UL — SIGNIFICANT CHANGE UP (ref 4.2–5.8)
RBC # FLD: 12.9 % — SIGNIFICANT CHANGE UP (ref 10.3–14.5)
RBC # FLD: 12.9 % — SIGNIFICANT CHANGE UP (ref 10.3–14.5)
SAO2 % BLDV: 70.3 % — SIGNIFICANT CHANGE UP (ref 67–88)
SAO2 % BLDV: 70.3 % — SIGNIFICANT CHANGE UP (ref 67–88)
SODIUM SERPL-SCNC: 138 MMOL/L — SIGNIFICANT CHANGE UP (ref 135–145)
SODIUM SERPL-SCNC: 138 MMOL/L — SIGNIFICANT CHANGE UP (ref 135–145)
WBC # BLD: 7.12 K/UL — SIGNIFICANT CHANGE UP (ref 3.8–10.5)
WBC # BLD: 7.12 K/UL — SIGNIFICANT CHANGE UP (ref 3.8–10.5)
WBC # FLD AUTO: 7.12 K/UL — SIGNIFICANT CHANGE UP (ref 3.8–10.5)
WBC # FLD AUTO: 7.12 K/UL — SIGNIFICANT CHANGE UP (ref 3.8–10.5)

## 2023-11-07 PROCEDURE — 73130 X-RAY EXAM OF HAND: CPT | Mod: 26,LT

## 2023-11-07 PROCEDURE — 99285 EMERGENCY DEPT VISIT HI MDM: CPT

## 2023-11-07 RX ORDER — ACETAMINOPHEN 500 MG
1000 TABLET ORAL ONCE
Refills: 0 | Status: COMPLETED | OUTPATIENT
Start: 2023-11-07 | End: 2023-11-07

## 2023-11-07 RX ORDER — AMPICILLIN SODIUM AND SULBACTAM SODIUM 250; 125 MG/ML; MG/ML
3 INJECTION, POWDER, FOR SUSPENSION INTRAMUSCULAR; INTRAVENOUS ONCE
Refills: 0 | Status: COMPLETED | OUTPATIENT
Start: 2023-11-07 | End: 2023-11-07

## 2023-11-07 RX ORDER — TETANUS TOXOID, REDUCED DIPHTHERIA TOXOID AND ACELLULAR PERTUSSIS VACCINE, ADSORBED 5; 2.5; 8; 8; 2.5 [IU]/.5ML; [IU]/.5ML; UG/.5ML; UG/.5ML; UG/.5ML
0.5 SUSPENSION INTRAMUSCULAR ONCE
Refills: 0 | Status: DISCONTINUED | OUTPATIENT
Start: 2023-11-07 | End: 2023-11-08

## 2023-11-07 RX ORDER — SODIUM CHLORIDE 9 MG/ML
1000 INJECTION INTRAMUSCULAR; INTRAVENOUS; SUBCUTANEOUS ONCE
Refills: 0 | Status: COMPLETED | OUTPATIENT
Start: 2023-11-07 | End: 2023-11-07

## 2023-11-07 RX ORDER — VANCOMYCIN HCL 1 G
1000 VIAL (EA) INTRAVENOUS ONCE
Refills: 0 | Status: COMPLETED | OUTPATIENT
Start: 2023-11-07 | End: 2023-11-07

## 2023-11-07 RX ADMIN — Medication 400 MILLIGRAM(S): at 22:47

## 2023-11-07 RX ADMIN — AMPICILLIN SODIUM AND SULBACTAM SODIUM 200 GRAM(S): 250; 125 INJECTION, POWDER, FOR SUSPENSION INTRAMUSCULAR; INTRAVENOUS at 22:46

## 2023-11-07 RX ADMIN — SODIUM CHLORIDE 1000 MILLILITER(S): 9 INJECTION INTRAMUSCULAR; INTRAVENOUS; SUBCUTANEOUS at 22:46

## 2023-11-07 NOTE — ED CLERICAL - NS ED CLERK NOTE PRE-ARRIVAL INFORMATION; ADDITIONAL PRE-ARRIVAL INFORMATION
CC/Reason For referral: Dog bite on the hand, Redness and swelling radiating up towards the arm from the hand  Preferred Consultant(if applicable): na  Who admits for you (if needed): na  Do you have documents you would like to fax over? no  Would you still like to speak to an ED attending? no

## 2023-11-07 NOTE — ED PROVIDER NOTE - PROGRESS NOTE DETAILS
called Dr. Jessica to discuss case. -Ann Marie Ragland PA-C spoke with Dr. Jessica, will see pt in am, recommending adding vancomycin in addition to unasyn. -Ann Marie Ragland PA-C Attending MD Winn: [facility] online animal bite form completed at 10:40:41pm on 11/7/23. Attending MD Winn: [facility] online animal bite form completed at 10:40:41pm on 11/7/23.  Will send to CDU for IV abx.

## 2023-11-07 NOTE — ED PROVIDER NOTE - OBJECTIVE STATEMENT
54 y/o male with no PMHx now presenting to the ED with dog bite to left hand yesterday. Dog belongs to patient  and is up to date on vaccinations. Patient stated his hand became swollen today and is unable to make fist due to pain. Has not been on antibiotics as of yet. Denied fever chills, weakness, streaking, numbness, CP, SOB, abdominal pain, N/V/D. Right hand dominant

## 2023-11-07 NOTE — ED PROVIDER NOTE - ATTENDING CONTRIBUTION TO CARE
Attending MD Winn:   I personally have seen and examined this patient.  Physician assistant note reviewed and agree on plan of care and except where noted.  See below for details.     Seen in [place] [facility]R    53M with no reported contributory PMH/PSH/Meds, no known drug allergies presents to the ED with L hand dog bite.  Reports he lives with his brother who has a Husky dog.  Reports the dog was eating paper/cardboard and he was trying to get it out of its mouth and the dog bit him.  Reports dog is UTD on vaccinations.  Reports now has pain Attending MD Winn:   I personally have seen and examined this patient.  Physician assistant note reviewed and agree on plan of care and except where noted.  See below for details.     Seen in [place] 31R    53M with no reported contributory PMH/PSH/Meds, no known drug allergies presents to the ED with L hand dog bite.  Reports he lives with his brother who has a Husky dog.  Reports the dog was eating paper/cardboard and he was trying to get it out of its mouth and the dog bit him.  Reports dog is UTD on vaccinations.  Reports now has increased pain and unable to make a fist.  Denies fevers, chills.  Denies numbness.  Reports R hand dominant.  Denies purulence or bleeding from wounds today.    Exam:   General: NAD  HENT: head NCAT, airway patent   Chest: symmetric chest rise, no increased work of breathing  MSK: ranging neck freely, limited ROM of L hand secondary to pain, +two puncture wounds to dorsum of L hand in area of hand between thumb and index finger, +erythema, no crepitus, no fluctuance, no purulent discharge, about 5cm x 8cm at widest  Neuro: moving all extremities spontaneously, sensory grossly intact, no gross neuro deficits  Psych: normal mood and affect     A/P: 53M with L hand dog bite, will obtain labs, start on IV Unasyn, will keep in CDU for IV antibiotics, hand consult, will obtain XR to eval for retained foreign body

## 2023-11-08 ENCOUNTER — NON-APPOINTMENT (OUTPATIENT)
Age: 53
End: 2023-11-08

## 2023-11-08 DIAGNOSIS — L08.9 LOCAL INFECTION OF THE SKIN AND SUBCUTANEOUS TISSUE, UNSPECIFIED: ICD-10-CM

## 2023-11-08 LAB
ERYTHROCYTE [SEDIMENTATION RATE] IN BLOOD: 26 MM/HR — HIGH (ref 0–20)
ERYTHROCYTE [SEDIMENTATION RATE] IN BLOOD: 26 MM/HR — HIGH (ref 0–20)

## 2023-11-08 PROCEDURE — 99236 HOSP IP/OBS SAME DATE HI 85: CPT

## 2023-11-08 PROCEDURE — 99222 1ST HOSP IP/OBS MODERATE 55: CPT

## 2023-11-08 RX ORDER — AMPICILLIN SODIUM AND SULBACTAM SODIUM 250; 125 MG/ML; MG/ML
3 INJECTION, POWDER, FOR SUSPENSION INTRAMUSCULAR; INTRAVENOUS EVERY 6 HOURS
Refills: 0 | Status: DISCONTINUED | OUTPATIENT
Start: 2023-11-08 | End: 2023-11-09

## 2023-11-08 RX ORDER — KETOROLAC TROMETHAMINE 30 MG/ML
15 SYRINGE (ML) INJECTION ONCE
Refills: 0 | Status: DISCONTINUED | OUTPATIENT
Start: 2023-11-08 | End: 2023-11-08

## 2023-11-08 RX ORDER — OXYCODONE HYDROCHLORIDE 5 MG/1
5 TABLET ORAL ONCE
Refills: 0 | Status: DISCONTINUED | OUTPATIENT
Start: 2023-11-08 | End: 2023-11-08

## 2023-11-08 RX ADMIN — AMPICILLIN SODIUM AND SULBACTAM SODIUM 200 GRAM(S): 250; 125 INJECTION, POWDER, FOR SUSPENSION INTRAMUSCULAR; INTRAVENOUS at 04:41

## 2023-11-08 RX ADMIN — Medication 250 MILLIGRAM(S): at 00:48

## 2023-11-08 RX ADMIN — OXYCODONE HYDROCHLORIDE 5 MILLIGRAM(S): 5 TABLET ORAL at 11:31

## 2023-11-08 RX ADMIN — Medication 15 MILLIGRAM(S): at 11:05

## 2023-11-08 RX ADMIN — AMPICILLIN SODIUM AND SULBACTAM SODIUM 200 GRAM(S): 250; 125 INJECTION, POWDER, FOR SUSPENSION INTRAMUSCULAR; INTRAVENOUS at 17:19

## 2023-11-08 RX ADMIN — AMPICILLIN SODIUM AND SULBACTAM SODIUM 200 GRAM(S): 250; 125 INJECTION, POWDER, FOR SUSPENSION INTRAMUSCULAR; INTRAVENOUS at 11:31

## 2023-11-08 RX ADMIN — AMPICILLIN SODIUM AND SULBACTAM SODIUM 200 GRAM(S): 250; 125 INJECTION, POWDER, FOR SUSPENSION INTRAMUSCULAR; INTRAVENOUS at 23:03

## 2023-11-08 NOTE — ED ADULT NURSE NOTE - OBJECTIVE STATEMENT
patient is a 54 y/o M with no pertinent PMHx now presenting to the ED with dog bite to left hand yesterday. Dog belongs to patient  and is up to date on vaccinations. Patient stated his hand became swollen today and is unable to make fist due to pain. Has not been on antibiotics as of yet. Denied fever chills, weakness, streaking, numbness, CP, SOB, abdominal pain, N/V/D. Right hand dominant. 20 G IV placed in LAC. resting in NAD. ambulatory independently

## 2023-11-08 NOTE — CONSULT NOTE ADULT - SUBJECTIVE AND OBJECTIVE BOX
Inpatient consult  See dictated note.  Written informed consent obtained.  Tolerated I&D of left hand.  Rec: Continue IV Vanco+Unasyn.  Consider discharge home tomorrow, if clinically stable, on 2wks of po abx (eg Augmentin+Doxycyclin) and follow up in my office next wk.  Please call me prn while inpt.

## 2023-11-08 NOTE — CONSULT NOTE ADULT - SUBJECTIVE AND OBJECTIVE BOX
Full note to follow  Semi-provoked dog bite  Cellulitis/abscess L hand  S/P I&D today  Patient states tetanus UTD, family dog UTD in shots not behaving abnormally and able to be monitored  Continue Abx  Local care per plastic  Augmentin 875mg PO Q12H plus Doxycycline 100mg PO q12H reasonable if discharged before sensi are available from culture  Thank you for the courtesy of this referral.  Donavon Booker MD  Attending Physician  [facility]  Division of Infectious Diseases  [phone]  ===================  [facility]  [phone]    BG JENSEN  53y, Male  [id number]    HPI--      PMH/PSH--  No pertinent past medical history    No significant past surgical history        Allergies--  No Known Allergies      Medications--  Antibiotics: ampicillin/sulbactam  IVPB 3 Gram(s) IV Intermittent every 6 hours    Immunologic:   Other:   Antimicrobials last 90 days per EMR: MEDICATIONS  (STANDING):  ampicillin/sulbactam  IVPB   200 mL/Hr IV Intermittent (11-08-23 @ 04:41)    ampicillin/sulbactam  IVPB   200 mL/Hr IV Intermittent (11-07-23 @ 22:46)    vancomycin  IVPB.   250 mL/Hr IV Intermittent (11-08-23 @ 00:48)        Social History--  EtOH: denies   Tobacco: denies   Drug Use: denies     Family/Marital History--  No pertinent family history in first degree relatives          Travel/Environmental/Occupational History:      Review of Systems:  A >=10-point review of systems was obtained.     Pertinent positives and negatives--  Constitutional: No fevers. No Chills. No Rigors.   Eyes:  ENMT:  Cardiovascular: No chest pain. No palpitations.  Respiratory: No shortness of breath. No cough.  Gastrointestinal: No nausea or vomiting. No diarrhea or constipation.   Genitourinary:  Musculoskeletal:  Skin:  Neurologic:  Psychiatric: Pleasant. Appropriate affect.  Endocrine:  Heme/Lymphatic:  Allergy/Immunologic:    Review of systems otherwise negative except as previously noted.    Physical Exam--  Vital Signs: T(F): 98 (11-08-23 @ 07:13), Max: 99 (11-07-23 @ 20:48)  HR: 79 (11-08-23 @ 07:13)  BP: 121/70 (11-08-23 @ 07:13)  RR: 18 (11-08-23 @ 07:13)  SpO2: 97% (11-08-23 @ 07:13)  Wt(kg): --  General: Nontoxic-appearing Male in no acute distress.  HEENT: AT/NC. PERRL. EOMI. Anicteric. Conjunctiva pink and moist. Oropharynx clear. Dentition fair.  Neck: Not rigid. No sense of mass.  Nodes: None palpable.  Lungs: Clear bilaterally without rales, wheezing or rhonchi  Heart: Regular rate and rhythm. No Murmur. No rub. No gallop. No palpable thrill.  Abdomen: Bowel sounds present and normoactive. Soft. Nondistended. Nontender. No sense of mass. No organomegaly.  Back: No spinal tenderness. No costovertebral angle tenderness.   Extremities: No cyanosis or clubbing. No edema.   Skin: Warm. Dry. Good turgor. No rash. No vasculitic stigmata.  Psychiatric: Appropriate affect and mood for situation.         Laboratory & Imaging Data--  CBC                        14.7   7.12  )-----------( 249      ( 07 Nov 2023 22:15 )             44.9       Chemistries  11-07    138  |  104  |  16  ----------------------------<  100<H>  4.6   |  21<L>  |  0.76    Ca    9.1      07 Nov 2023 22:15    TPro  7.7  /  Alb  4.2  /  TBili  0.3  /  DBili  x   /  AST  31  /  ALT  33  /  AlkPhos  122<H>  11-07      Culture Data       Full note to follow  Semi-provoked dog bite  Cellulitis/abscess L hand  S/P I&D today  Patient states tetanus UTD, family dog UTD in shots not behaving abnormally and able to be monitored  Continue Abx  Local care per plastic  Augmentin 875mg PO Q12H plus Doxycycline 100mg PO q12H reasonable if discharged before sensi are available from culture  Thank you for the courtesy of this referral.  Donavon Booker MD  Attending Physician  [facility]  Division of Infectious Diseases  [phone]  ===================  [facility]  [phone]    BG JENSEN  53y, Male  [id number]    HPI--  This is a 53-year-old man bitten by his old dog while trying to prevent it from eating cardboard and paper.  The dog is 10 years old, healthy, up-to-date on its vaccinations.  Patient developed redness and swelling and infection in the hand.  He is now status post incision and drainage.    He has no complaints.  Pain control is adequate.    PMH/PSH--  No pertinent past medical history    No significant past surgical history        Allergies--  No Known Allergies      Medications--  Antibiotics: ampicillin/sulbactam  IVPB 3 Gram(s) IV Intermittent every 6 hours    Immunologic:   Other:   Antimicrobials last 90 days per EMR: MEDICATIONS  (STANDING):  ampicillin/sulbactam  IVPB   200 mL/Hr IV Intermittent (11-08-23 @ 04:41)    ampicillin/sulbactam  IVPB   200 mL/Hr IV Intermittent (11-07-23 @ 22:46)    vancomycin  IVPB.   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     Pertinent positives and negatives--  Constitutional: No fevers. No Chills. No Rigors.   Eyes: denies.   ENMT: denies.   Cardiovascular: No chest pain. No palpitations. Borderline cholesterol.  Respiratory: No shortness of breath. No cough.  Gastrointestinal: No nausea or vomiting. No diarrhea or constipation.   Genitourinary: denies.   Musculoskeletal: as above  Skin: denies.   Neurologic: denies.   Psychiatric: Pleasant. Appropriate affect.  Endocrine: possible pre-diabetic  Heme/Lymphatic: denies.   Allergy/Immunologic: denies.     Review of systems otherwise negative except as previously noted.    Physical Exam--  Vital Signs: T(F): 98 (11-08-23 @ 07:13), Max: 99 (11-07-23 @ 20:48)  HR: 79 (11-08-23 @ 07:13)  BP: 121/70 (11-08-23 @ 07:13)  RR: 18 (11-08-23 @ 07:13)  SpO2: 97% (11-08-23 @ 07:13)  Wt(kg): --  General: Nontoxic-appearing Male in no acute distress.  HEENT: AT/NC. Anicteric. Conjunctiva pink and moist. Oropharynx clear.  Neck: Not rigid. No sense of mass.  Nodes: None palpable.  Lungs: Clear bilaterally without rales, wheezing or rhonchi  Heart: Regular rate and rhythm.  Abdomen: Bowel sounds present and normoactive. Soft. Nondistended. Nontender.   Extremities: No cyanosis or clubbing. Left hand dressed.  No proximal lymphangitis or adenopathy.  Skin: Warm. Dry. Good turgor. No rash. No vasculitic stigmata.  Psychiatric: Appropriate affect and mood for situation.         Laboratory & Imaging Data--  CBC                        14.7   7.12  )-----------( 249      ( 07 Nov 2023 22:15 )             44.9       Chemistries  11-07    138  |  104  |  16  ----------------------------<  100<H>  4.6   |  21<L>  |  0.76    Ca    9.1      07 Nov 2023 22:15    TPro  7.7  /  Alb  4.2  /  TBili  0.3  /  DBili  x   /  AST  31  /  ALT  33  /  AlkPhos  122<H>  11-07    < from: Xray Hand 3 Views, Left (11.07.23 @ 21:46) >    IMPRESSION:  No radiopaque foreign body. Small rounded lucency between the first and   second metacarpals concerning for a small locule of air.    No acute fracture or dislocation.    < end of copied text >      Culture Data  None

## 2023-11-08 NOTE — H&P ADULT - ASSESSMENT
HPI:    74 yo M with PMHx of hemolytic anemia s/p splenectomy, Afib (treated with lopressor and diltiazem and later transitioned to amiodarone) s/p DCCV now on eliquis, low grade pancreatic neuroendocrine tumor diagnosed in 9/2019, orthostatic hypotension, CKD, west nile infection who presented with fatigue for several day, one episode of syncope this morning with fell on his back. Pt reported that he went to bathroom this morning and fell down to the ground on his back. Later he woke up and found his wife at his side. Denied heart racing, diaphoresis, N/V/D, chest pain, sever SOB, headache, dizziness, vertigo, vision/hearing changes, focal weakness. Admitted back pain and some bruises on Legs. Denied melena, hematuria, fever/chills, and skin rashes.    To be noted he was treated with disseminated norcardia for 8 months with bactrim; and bactrim stopped around 8/2021. He followed ID Dr. Roxie Lynch.      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 He started with prednisone since 4/2019, and hemolysis relapsed after prednisone was tapered down to 2.5mg daily. He lost a brief response to a second round per clinic visit chart. He also received IVGG/Danazol in 3/2021, rituxan x4 in 4/2021.  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 Surgical removal was not recc as it is unlikely to be clinically beneficial. He received the first dose of Cytoxan+rituxan while in hospital on 8/10/21 and 2nd dose on 8/30/21.       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  HLD (hyperlipidemia)  GERD (gastroesophageal reflux disease)  Lung nodule      S/P percutaneous endoscopic gastrostomy (PEG) tube placement  S/P tonsillectomy  S/P cholecystectomy 3/2021  H/O inguinal hernia repair &gt; 10 years ago mesh in place  H/O splenectomy 6/24/21        Allergies No Known Allergies          MEDICATIONS  (STANDING):    MEDICATIONS  (PRN):      FAMILY HISTORY:  FH: liver cancer (Mother)        SOCIAL HISTORY: No EtOH, no tobacco    REVIEW OF SYSTEMS:    CONSTITUTIONAL: No weakness, fevers or chills  EYES/ENT: No visual changes;  No vertigo or throat pain   NECK: No pain or stiffness  RESPIRATORY: No cough, wheezing, hemoptysis; No shortness of breath  CARDIOVASCULAR: No chest pain or palpitations  GASTROINTESTINAL: No abdominal or epigastric pain. No nausea, vomiting, or hematemesis; No diarrhea or constipation. No melena or hematochezia.  GENITOURINARY: No dysuria, frequency or hematuria  NEUROLOGICAL: No numbness or weakness  SKIN: No itching, burning, rashes, or lesions   All other review of systems is negative unless indicated above.    Height (cm): 182.9 (09-03 @ 08:27)  Weight (kg): 77.6 (09-03 @ 08:27)  BMI (kg/m2): 23.2 (09-03 @ 08:27)  BSA (m2): 1.99 (09-03 @ 08:27)    T(F): 97.8 (09-03-21 @ 14:22), Max: 98.5 (09-03-21 @ 12:45)  HR: 78 (09-03-21 @ 14:22)  BP: 125/75 (09-03-21 @ 14:22)  RR: 22 (09-03-21 @ 14:22)  SpO2: 100% (09-03-21 @ 14:22)  Wt(kg): --    GENERAL: appeared fatigue, well-developed; AAOX3   HEAD:  Atraumatic, Normocephalic  EYES: EOMI, PERRLA, pale conjunctiva; anicteric sclera   NECK: Supple, No JVD  CHEST/LUNG: Clear to auscultation bilaterally; No wheeze  HEART: RRR; No murmurs, rubs.  + gallops  ABDOMEN: Soft, Nontender, Nondistended; Bowel sounds present  EXTREMITIES:  2+ Peripheral Pulses, No clubbing, cyanosis, or edema  NEUROLOGY: non-focal weakness   SKIN: No rashes or lesions                          5.5    13.28 )-----------( 390      ( 03 Sep 2021 09:48 )             18.5       09-03    139  |  104  |  35<H>  ----------------------------<  142<H>  4.1   |  21<L>  |  1.71<H>    Ca    8.8      03 Sep 2021 09:48  Phos  2.6     09-03  Mg     2.3     09-03    TPro  5.8<L>  /  Alb  3.5  /  TBili  3.0<H>  /  DBili  x   /  AST  38  /  ALT  30  /  AlkPhos  66  09-03      Magnesium, Serum: 2.3 mg/dL (09-03 @ 09:48)  Phosphorus Level, Serum: 2.6 mg/dL (09-03 @ 09:48)      PT/INR - ( 03 Sep 2021 09:48 )   PT: 18.9 sec;   INR: 1.61 ratio         PTT - ( 03 Sep 2021 09:48 )  PTT:33.3 sec    .Blood Blood-Peripheral  08-11 @ 03:24   No Growth Final  --  --        54 y/o male with no PMHx now presenting to the ED with dog bite to left hand yesterday. Dog belongs to patient  and is up to date on vaccinations. Patient stated his hand became swollen today and is unable to make fist due to pain. Has not been on antibiotics as of yet. Denied fever chills, weakness, streaking, numbness, CP, SOB, abdominal pain, N/V/D. Right hand dominant    eft hand infection 2/2 dog bite  - s/p I&D by elen  - abx as per ID  - pain control  - poss discharge tomorrow

## 2023-11-08 NOTE — ED CDU PROVIDER INITIAL DAY NOTE - DETAILS
52 y/o male with no PMHx now presenting to the ED with dog bite to left hand yesterday.   Plan: Hand, Dr. Jessica, will see pt in am, recommending adding Vancomycin in addition to Unasyn. frequent reeval, vitals q 4hrs, iv abx, pain control.

## 2023-11-08 NOTE — H&P ADULT - NSHPLABSRESULTS_GEN_ALL_CORE
LABS:                        14.7   7.12  )-----------( 249      ( 07 Nov 2023 22:15 )             44.9     11-07    138  |  104  |  16  ----------------------------<  100<H>  4.6   |  21<L>  |  0.76    Ca    9.1      07 Nov 2023 22:15    TPro  7.7  /  Alb  4.2  /  TBili  0.3  /  DBili  x   /  AST  31  /  ALT  33  /  AlkPhos  122<H>  11-07      Urinalysis Basic - ( 07 Nov 2023 22:15 )    Color: x / Appearance: x / SG: x / pH: x  Gluc: 100 mg/dL / Ketone: x  / Bili: x / Urobili: x   Blood: x / Protein: x / Nitrite: x   Leuk Esterase: x / RBC: x / WBC x   Sq Epi: x / Non Sq Epi: x / Bacteria: x            RADIOLOGY & ADDITIONAL TESTS:

## 2023-11-08 NOTE — ED CDU PROVIDER DISPOSITION NOTE - CLINICAL COURSE
54 y/o male with no PMHx now presenting to the ED with dog bite to left hand yesterday. Dog belongs to patient  and is up to date on vaccinations. Patient stated his hand became swollen today and is unable to make fist due to pain. Has not been on antibiotics as of yet. Denied fever chills, weakness, streaking, numbness, CP, SOB, abdominal pain, N/V/D. Right hand dominant.  In ED, patient had non actionable labs. X ray showed No radiopaque foreign body. Small rounded lucency between the first and second metacarpals concerning for a small locule of air. No acute fracture or dislocation. ED discussed w/ Hand, Dr. Jessica, will see pt in am, recommending adding Vancomycin in addition to Unasyn. Pt sent to CDU for frequent reeval, vitals q 4hrs, iv abx, pain control. 52 y/o male with no PMHx now presenting to the ED with dog bite to left hand yesterday. Dog belongs to patient  and is up to date on vaccinations. Patient stated his hand became swollen today and is unable to make fist due to pain. Has not been on antibiotics as of yet. Denied fever chills, weakness, streaking, numbness, CP, SOB, abdominal pain, N/V/D. Right hand dominant.  In ED, patient had non actionable labs. X ray showed No radiopaque foreign body. Small rounded lucency between the first and second metacarpals concerning for a small locule of air. No acute fracture or dislocation. ED discussed w/ Hand, Dr. Jessica, will see pt in am, recommending adding Vancomycin in addition to Unasyn. Pt sent to CDU for frequent reeval, vitals q 4hrs, iv abx, pain control.  in cdu, pt with worsening erythema, edema of his L hand/dog bite infection. pt seen by Hand specialist- who I&D at bedside and recommend additional IV abx and medicine admission. pt admitted to medicine.

## 2023-11-08 NOTE — ED CDU PROVIDER INITIAL DAY NOTE - PROGRESS NOTE DETAILS
CDU NOTE JAMAL Otero: pt resting reports no improvement of L hand after IV antibiotics, feels 2nd digit at base seems a little more swollen. some pain but tolerable, pain worse with certain movement of fingers/hand. NAD. VSS. L hand erythema outside boundary previously drawn, now extending more medially at dorsum of hand. able to move fingers/hand but with pain. mild decreased range of motion of fingers, primarily first and second digits associated with pain and swelling. no streaking. no pain with ranging of L wrist. pt to be seen by Dr. Jessica today. CDU NOTE JAMAL Otero: pt seen by Dr. Jesscia - Plastics/Hand, who I&D abscess at bedside. recommending continuing current IV abx regimen and admission to medicine.   Dr. Jessica agrees with admission. call out to unattached medicine.

## 2023-11-08 NOTE — ED CDU PROVIDER INITIAL DAY NOTE - NS ED ATTENDING STATEMENT MOD
[facility]    ICU TEAM Progress Note    Name: Conor Salazar   : 1992   MRN: [id number]   Date: 2020      Assessment:     ICU Problems: admitted 18 with severe Covid s/p trach     1. Covid-19 - PNA fibroproliferative ARDS   2. Resp failure s/p trach  3. PTX with CT  4. Sinus pauses cards seeing - no more events overnight   a. Avoid blocking agents  5. Tob use   6. ? Bacterial superinfection - cont vanco and cefepime  7. Sedation    A. Ketamine, dilaudid, versed     Overnight Events:   2020  Pt with fever yesterday, period of hypoxemia and increased WOB responded to paralytic Pneumomediastinum about same Cx pending     Active Problem List:     Problem List  Date Reviewed: 2020          Codes Class    Acute respiratory failure ([facility] ICD-10-CM: J96.00  ICD-9-CM: 518.81         Pneumothorax on left ICD-10-CM: J93.9  ICD-9-CM: 512.89         Pneumonia due to severe acute respiratory syndrome coronavirus 2 (SARS-CoV-2) ICD-10-CM: U07.1, J12.89  ICD-9-CM: 480.8         Respiratory failure with hypoxia ([facility] ICD-10-CM: J96.91  ICD-9-CM: 518.81               Past Medical History:      has a past medical history of History of vascular access device (08/10/2020). Past Surgical History:      has a past surgical history that includes ir thora/ins chest tube(pneumo) wo image (2020) and ir thora/ins chest tube(pneumo) wo image (2020). Home Medications:     Prior to Admission medications    Medication Sig Start Date End Date Taking? Authorizing Provider   benzonatate (Tessalon Perles) 100 mg capsule Take 100 mg by mouth three (3) times daily as needed for Cough.     Provider, Historical       Allergies/Social/Family History:     No Known Allergies   Social History     Tobacco Use    Smoking status: Current Some Day Smoker    Smokeless tobacco: Never Used   Substance Use Topics    Alcohol use: Not on file        Objective:   Vital Signs:  Visit Vitals  /60 Pulse (!) 108   Temp 99.9 °F (37.7 °C)   Resp 26   Ht 5' 11\" (1.803 m)   Wt 99.8 kg (220 lb 0.3 oz)   SpO2 100%   BMI 30.69 kg/m²      O2 Device: Endotracheal tube, Ventilator Temp (24hrs), Av.4 °F (31.9 °C), Min:35.6 °F (2 °C), Max:101.3 °F (38.5 °C)           Intake/Output:     Intake/Output Summary (Last 24 hours) at 2020 0843  Last data filed at 2020 0700  Gross per 24 hour   Intake 3742.6 ml   Output 3635 ml   Net 107.6 ml       Physical Exam:    General:  On vent    Eyes:  Sclera anicteric. Pupils equally round and reactive to light. Mouth/Throat: Mucous membranes normal, oral pharynx clear   Neck: Supple   Lungs:   Clear to auscultation bilaterally, good effort   CV:  Regular rate and rhythm,no murmur, click, rub or gallop   Abdomen:   Soft, non-tender. bowel sounds normal. non-distended   Extremities: No cyanosis or edema   Skin: Skin color, texture, turgor normal. no acute rash or lesions   Lymph nodes: Cervical and supraclavicular normal   Musculoskeletal: No swelling or deformity   Lines/Devices:  Intact, no erythema, drainage or tenderness           LABS AND  DATA: Personally reviewed  Recent Labs     20   WBC 11.0 10.3   HGB 9.1* 9.6*   HCT 28.6* 29.5*   * 131*     Recent Labs     20    136 137   K 3.7 4.1 3.1*    99 99   CO2 32 33* 34*   BUN 19 20 24*   CREA 0.52* 0.52* 0.62*   * 133* 106*   CA 8.1* 8.4* 8.4*   MG 2.4  --  2.4   PHOS 1.9*  --  2.3*     Recent Labs     20   * 130*   TP 6.3* 6.8   ALB 1.9* 2.2*   GLOB 4.4* 4.6*     No results for input(s): INR, PTP, APTT, INREXT, INREXT in the last 72 hours.    Recent Labs     20  0351 20  1136   PHI 7.48* 7.40   PCO2I 47.1* 56.8*   PO2I 78* 56*   FIO2I 60 50     Recent Labs     20  0542 20  1938  20  2110   CPK 77 125   < >  --    TROIQ  --   --   --  0.21*    < > = values in this interval not displayed. Hemodynamics:   PAP:   CO:     Wedge:   CI:     CVP:    SVR:       PVR:       Ventilator Settings:  Mode Rate Tidal Volume Pressure FiO2 PEEP   Pressure control   0 ml  0 cm H2O 60 % 10 cm H20     Peak airway pressure: 33 cm H2O    Minute ventilation: 12.2 l/min        MEDS: Reviewed    Chest X-Ray:  CXR Results  (Last 48 hours)               08/23/20 0430  XR CHEST PORT Final result    Impression:  IMPRESSION:    1. There is diffuse bilateral airspace disease which is improved in the left   midlung and slightly progressed in the right lower lobe. Pneumomediastinum is   less apparent. Subcutaneous emphysema is again noted. There is no pneumothorax. Narrative:  EXAM:  XR CHEST PORT       INDICATION:  Tube Placement       COMPARISON:  8/22/2020       FINDINGS: A portable AP radiograph of the chest was obtained at 428 hours. ET   tube is in satisfactory position. NG tube and left PICC catheter tip are   unchanged. .  There is diffuse airspace disease which is improved in the left   midlung and slightly progressed at the right lung base. Subcutaneous emphysema   is again noted. Pneumomediastinum is less apparent. [place] Heart size is stable. Pigtail catheter overlying left base is unchanged. .  Bony structures are   unchanged. 08/22/20 1513  XR CHEST PORT Final result    Impression:  IMPRESSION: Stable. Narrative:  EXAM: Portable CXR. 1505 hours. COMPARISON: 0348 hours. INDICATION: Pneumo       FINDINGS:   No significant change. No pneumothorax. Questionable pneumomediastinum. Extensive subcutaneous emphysema. ET tube tip remains at the victor hugo. Left arm PICC line tip remains in the SVC. NG   tube remains in the stomach. Lungs are stable with dense consolidation left greater than right. Lung volumes   are low.  Heart size is normal.           08/22/20 0442  XR CHEST PORT Final result    Impression:  IMPRESSION: Persistent bilateral diffuse This was a shared visit with the JEANMARIE. I reviewed and verified the documentation and independently performed the documented: airspace opacification. New   pneumomediastinum. Narrative:  EXAM: XR CHEST PORT       INDICATION: Tube Placement       COMPARISON: Previous day       FINDINGS: A portable AP radiograph of the chest was obtained at 0348 hours. The   patient is on a cardiac monitor. There is diffuse bilateral airspace   opacification as seen previously. There is new pneumomediastinum. The   endotracheal tube and nasogastric tube are stable. The PICC line is stable. The   cardiac and mediastinal contours and pulmonary vascularity are normal.  The   bones and soft tissues are grossly within normal limits. Multidisciplinary Rounds Completed: Yes    ABCDEF Bundle/Checklist Completed:  Yes      DISPOSITION  Stay in ICU    CRI  I personally spent 40 minutes of critical care time. This is time spent at this critically ill patient's bedside actively involved in patient care as well as the coordination of care and discussions with the patient's family. This does not include any procedural time which has been billed separately.     [address] [facility]  8/23/2020

## 2023-11-08 NOTE — ED ADULT NURSE REASSESSMENT NOTE - NSFALLUNIVINTERV_ED_ALL_ED
Bed/Stretcher in lowest position, wheels locked, appropriate side rails in place/Call bell, personal items and telephone in reach/Instruct patient to call for assistance before getting out of bed/chair/stretcher/Non-slip footwear applied when patient is off stretcher/[place] to call system/Physically safe environment - no spills, clutter or unnecessary equipment/Purposeful proactive rounding/Room/bathroom lighting operational, light cord in reach

## 2023-11-08 NOTE — ED CDU PROVIDER DISPOSITION NOTE - ATTENDING APP SHARED VISIT CONTRIBUTION OF CARE
Attending Note -- Delmer d/w CDU PA.  Patient seen and evaluated in CDU.  Labs/imaging reviewed.  XR finding of lucency corresponds to puncture wound on patient's hand.  Clinically seems worse based on stated increasing pain and swelling, both mild.  Erythema seems to be extending beyond demarcated borders on dorsum of thenar eminence/1st-2nd webspace.  compartments are soft, pain is in proportion to exam, no kanaval signs, no crepitation, and no streaking proximally.  All joints are full and painless with rom.  Pt will need admission for IV abx, will c/s ID re possible broadening of abx.  Hand c/s prior to my shift, still pending their arrival.  --DUDLEY

## 2023-11-08 NOTE — CONSULT NOTE ADULT - ASSESSMENT
Semi-provoked dog bite  Cellulitis/abscess L hand  S/P I&D today  Patient states tetanus UTD, family dog UTD in shots not behaving abnormally and able to be monitored  Continue Abx  Local care per plastic  Augmentin 875mg PO Q12H plus Doxycycline 100mg PO q12H reasonable if discharged before sensi are available from culture  Thank you for the courtesy of this referral.  Donavon Booker MD  Attending Physician  [facility]  Division of Infectious Diseases  [phone]

## 2023-11-08 NOTE — ED CDU PROVIDER INITIAL DAY NOTE - ATTENDING APP SHARED VISIT CONTRIBUTION OF CARE
Attending MD Winn:   I personally have seen and examined this patient.  Physician assistant note reviewed and agree on plan of care and except where noted.  See below for details.     Seen in [facility]    53M with no reported contributory PMH/PSH/Meds, no known drug allergies presents to the ED with L hand dog bite.  Reports he lives with his brother who has a Husky dog.  Reports the dog was eating paper/cardboard and he was trying to get it out of its mouth and the dog bit him.  Reports dog is UTD on vaccinations.  Reports now has increased pain and unable to make a fist.  Denies fevers, chills.  Denies numbness.  Reports R hand dominant.  Denies purulence or bleeding from wounds today.    Exam:   General: NAD  HENT: head NCAT, airway patent   Chest: symmetric chest rise, no increased work of breathing  MSK: ranging neck freely, limited ROM of L hand secondary to pain, +two puncture wounds to dorsum of L hand in area of hand between thumb and index finger, +erythema, no crepitus, no fluctuance, no purulent discharge, about 5cm x 8cm at widest  Neuro: moving all extremities spontaneously, sensory grossly intact, no gross neuro deficits  Psych: normal mood and affect     A/P: 53M with L hand dog bite, will obtain labs, start on IV Unasyn, will keep in CDU for IV antibiotics, hand consult, will obtain XR to eval for retained foreign body.

## 2023-11-08 NOTE — H&P ADULT - HISTORY OF PRESENT ILLNESS
52 y/o male with no PMHx now presenting to the ED with dog bite to left hand yesterday. Dog belongs to patient  and is up to date on vaccinations. Patient stated his hand became swollen today and is unable to make fist due to pain. Has not been on antibiotics as of yet. Denied fever chills, weakness, streaking, numbness, CP, SOB, abdominal pain, N/V/D. Right hand dominant

## 2023-11-08 NOTE — ED ADULT NURSE REASSESSMENT NOTE - NS ED NURSE REASSESS COMMENT FT1
07.00 Received the Pt from  [facility] Pt is Observed for Lt hand cellulitis /Dog bite .Received the Pt A&OX 4  Pt obeys commands Alley N/V/D fever chills cp SOB   Comfort care & safety measures continued  IV site looks clean & dry no signs of infiltration noted pt denies  pain IV site .Pt is oriented to the unit Plan of care explained .  Pt is advised to call for help  call bell with in the reach pt verbalized the understanding .  pending CDU  MD louis . GCS 15/15 A&OX 4 PERRLA  size 3 Strong upper & lower extremities steady gait  Pt is ambulatory & independent   No facial droop  No Hand Leg drop denies numbness tingling  + Left hand swelling  + redness and redness spreading beyond the marker site Plan of care ongoing no

## 2023-11-08 NOTE — ED CDU PROVIDER INITIAL DAY NOTE - OBJECTIVE STATEMENT
52 y/o male with no PMHx now presenting to the ED with dog bite to left hand yesterday. Dog belongs to patient  and is up to date on vaccinations. Patient stated his hand became swollen today and is unable to make fist due to pain. Has not been on antibiotics as of yet. Denied fever chills, weakness, streaking, numbness, CP, SOB, abdominal pain, N/V/D. Right hand dominant.  In ED, patient had non actionable labs. X ray showed No radiopaque foreign body. Small rounded lucency between the first and second metacarpals concerning for a small locule of air. No acute fracture or dislocation. ED discussed w/ Hand, Dr. Jessica, will see pt in am, recommending adding Vancomycin in addition to Unasyn. Pt sent to CDU for frequent reeval, vitals q 4hrs, iv abx, pain control.

## 2023-11-08 NOTE — ED CDU PROVIDER DISPOSITION NOTE - NS ED ATTENDING STATEMENT MOD
This was a shared visit with the JEANMARIE. I reviewed and verified the documentation and independently performed the documented:

## 2023-11-08 NOTE — ED CDU PROVIDER INITIAL DAY NOTE - PROGRESS NOTE ADDITIONAL1
Daria Kelley is a 55 year old White female.    HPI     Follow-up    Additional comments: Corneal abrasion follow up per Dr. Adamson.  Patient is feeling much better.  She is still using tobradex QID OD.       Last edited by Karie Tsai, OD on 8/24/2017  9:28 AM. (History)           Assessment/Plan   1. Corneal abrasion, right, subsequent encounter  - Taper tobradex to TID x 1 day, BID x 1 day, and QD x 1 day.  - Okay to re-start contact lens wear after tobradex taper is done.     I have educated the patient on all examination findings.    Return if symptoms worsen or fail to improve.     Karie Tsai, OD  [address] [place] [address] [phone]       Additional Progress Note...

## 2023-11-09 ENCOUNTER — TRANSCRIPTION ENCOUNTER (OUTPATIENT)
Age: 53
End: 2023-11-09

## 2023-11-09 VITALS
DIASTOLIC BLOOD PRESSURE: 77 MMHG | OXYGEN SATURATION: 96 % | TEMPERATURE: 98 F | SYSTOLIC BLOOD PRESSURE: 119 MMHG | HEART RATE: 101 BPM | RESPIRATION RATE: 18 BRPM
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
## 2023-11-09 DIAGNOSIS — L03.012 CELLULITIS OF LEFT FINGER: ICD-10-CM

## 2023-11-09 PROCEDURE — 96374 THER/PROPH/DIAG INJ IV PUSH: CPT

## 2023-11-09 PROCEDURE — 85025 COMPLETE CBC W/AUTO DIFF WBC: CPT

## 2023-11-09 PROCEDURE — 82435 ASSAY OF BLOOD CHLORIDE: CPT

## 2023-11-09 PROCEDURE — 83605 ASSAY OF LACTIC ACID: CPT

## 2023-11-09 PROCEDURE — 84132 ASSAY OF SERUM POTASSIUM: CPT

## 2023-11-09 PROCEDURE — 99285 EMERGENCY DEPT VISIT HI MDM: CPT | Mod: 25

## 2023-11-09 PROCEDURE — 85018 HEMOGLOBIN: CPT

## 2023-11-09 PROCEDURE — 73130 X-RAY EXAM OF HAND: CPT

## 2023-11-09 PROCEDURE — 82947 ASSAY GLUCOSE BLOOD QUANT: CPT

## 2023-11-09 PROCEDURE — 82803 BLOOD GASES ANY COMBINATION: CPT

## 2023-11-09 PROCEDURE — 80053 COMPREHEN METABOLIC PANEL: CPT

## 2023-11-09 PROCEDURE — 87070 CULTURE OTHR SPECIMN AEROBIC: CPT

## 2023-11-09 PROCEDURE — 85652 RBC SED RATE AUTOMATED: CPT

## 2023-11-09 PROCEDURE — 86140 C-REACTIVE PROTEIN: CPT

## 2023-11-09 PROCEDURE — 84295 ASSAY OF SERUM SODIUM: CPT

## 2023-11-09 PROCEDURE — 82330 ASSAY OF CALCIUM: CPT

## 2023-11-09 PROCEDURE — 85014 HEMATOCRIT: CPT

## 2023-11-09 PROCEDURE — 99232 SBSQ HOSP IP/OBS MODERATE 35: CPT

## 2023-11-09 PROCEDURE — 87077 CULTURE AEROBIC IDENTIFY: CPT

## 2023-11-09 RX ADMIN — AMPICILLIN SODIUM AND SULBACTAM SODIUM 200 GRAM(S): 250; 125 INJECTION, POWDER, FOR SUSPENSION INTRAMUSCULAR; INTRAVENOUS at 11:16

## 2023-11-09 RX ADMIN — AMPICILLIN SODIUM AND SULBACTAM SODIUM 200 GRAM(S): 250; 125 INJECTION, POWDER, FOR SUSPENSION INTRAMUSCULAR; INTRAVENOUS at 05:11

## 2023-11-09 NOTE — DISCHARGE NOTE NURSING/CASE MANAGEMENT/SOCIAL WORK - NSDCFUADDAPPT_GEN_ALL_CORE_FT
APPTS ARE READY TO BE MADE: [X] YES    Best Family or Patient Contact (if needed):    Additional Information about above appointments (if needed):    1: Follow-up with Dr. Trivedi in one week for re-evaluation and antibiotics may need to be adjusted based on culture and sensitivity.   2: Follow up with PCP outpatient  3: Can follow up L hand wound culture/sensitivty results with ID Dr. Booker outpatient    Other comments or requests:

## 2023-11-09 NOTE — DISCHARGE NOTE PROVIDER - NSDCMRMEDTOKEN_GEN_ALL_CORE_FT
amoxicillin-clavulanate 875 mg-125 mg oral tablet: 1 tab(s) orally every 12 hours as directed  doxycycline hyclate 100 mg oral tablet: 1 tab(s) orally every 12 hours as directed

## 2023-11-09 NOTE — DISCHARGE NOTE PROVIDER - NPI NUMBER (FOR SYSADMIN USE ONLY) :
[[id number]],[[id number]] [[id number]],[[id number]] [[id number]],[[id number]] [[id number]],[[id number]],[[id number]] [[id number]],[[id number]],[[id number]] [[id number]],[[id number]],[[id number]],[[id number]]

## 2023-11-09 NOTE — DISCHARGE NOTE PROVIDER - NSDCCPCAREPLAN_GEN_ALL_CORE_FT
PRINCIPAL DISCHARGE DIAGNOSIS  Diagnosis: Infection of left hand  Assessment and Plan of Treatment: Patient's dog bite L hand on 11/06/2023. Patient presented to ED on 11/07/2023.   L hand x-ray completed in ED revealed: No acute fracture or dislocation. Joint spaces are maintained. Soft tissue swelling about the hand. No radiopaque foreign body. Rounded lucency between the first and second metacarpals concerning for a small locule of air. IMPRESSION: No radiopaque foreign body. Small rounded lucency between the first and second metacarpals concerning for a small locule of air. No acute fracture or dislocation.  Seen by plastics, I&D done on 11/08/2023. Patient received 7 doses of IV Unasyn inpatient and one dose of IV Vancomycin.   Will take the following antibiotics at home:  Augmentin 875mg PO every 12 hours and Doxycycline PO every 12 hours for two weeks.   Culture and sensitivty sent, based on results antibiotics may need to be changed outpatient.   Must follow-up with plastic surgeon Dr. Trivedi in one week. Antibiotic regimen may be changed outpatient based on culture and sensitivities.   Patient educated that must follow-up with plastic surgery. Any worsening of swelling, redness or fever to return to hosptial.     PRINCIPAL DISCHARGE DIAGNOSIS  Diagnosis: Infection of left hand  Assessment and Plan of Treatment: Patient's dog bite L hand on 11/06/2023. Patient presented to ED on 11/07/2023.   L hand x-ray completed in ED revealed: No acute fracture or dislocation. Joint spaces are maintained. Soft tissue swelling about the hand. No radiopaque foreign body. Rounded lucency between the first and second metacarpals concerning for a small locule of air. IMPRESSION: No radiopaque foreign body. Small rounded lucency between the first and second metacarpals concerning for a small locule of air. No acute fracture or dislocation.  Seen by plastics, I&D done on 11/08/2023. Patient received 7 doses of IV Unasyn inpatient and one dose of IV Vancomycin.   Will take the following antibiotics at home:  Augmentin 875mg PO every 12 hours and Doxycycline PO every 12 hours for 10 days.   Culture and sensitivty sent, based on results antibiotics may need to be changed outpatient.   Must follow-up with plastic surgeon Dr. Trivedi in one week. Antibiotic regimen may be changed outpatient based on culture and sensitivities.   Patient educated that must follow-up with plastic surgery. Any worsening of swelling, redness or fever to return to hosptial.

## 2023-11-09 NOTE — DISCHARGE NOTE NURSING/CASE MANAGEMENT/SOCIAL WORK - PATIENT PORTAL LINK FT
You can access the FollowMyHealth Patient Portal offered by [facility] by registering at the following website: [email or web address]. By joining [facility]’s FollowMyHealth portal, you will also be able to view your health information using other applications (apps) compatible with our system.

## 2023-11-09 NOTE — DISCHARGE NOTE PROVIDER - CARE PROVIDER_API CALL
Nicole Jessica  Plastic Surgery  [address] [place] [address], [address]  [place] [address]  Phone: [phone]  Fax: [phone]  Follow Up Time: 1 week    COLUMBA NG  [address] [place] [address]  Phone: ()-  Fax: ()-  Established Patient  Follow Up Time: 1 week   Nicole Jessica  Plastic Surgery  [address] [place] [address], [address]  [place] [address]  Phone: [phone]  Fax: [phone]  Follow Up Time: 1 week    COLUMBA NG  [address] [place] [address]  Phone: ()-  Fax: ()-  Established Patient  Follow Up Time: 1 week   Nicole Jessica  Plastic Surgery  [address] [place] [address], [address]  [place] [address]  Phone: [phone]  Fax: [phone]  Follow Up Time: 1 week    COLUMBA NG  [address] [place] [address]  Phone: ()-  Fax: ()-  Established Patient  Follow Up Time: 1 week   Nicole Jessica  Plastic Surgery  [address] [place] [address], [address]  [place] [address]  Phone: [phone]  Fax: [phone]  Follow Up Time: 1 week    COLUMBA NG  [address] [place] [address]  Phone: ()-  Fax: ()-  Established Patient  Follow Up Time: 1 week    Donavon Booker  Infectious Disease  [address] [place] [address]  Phone: [phone]  Fax: ()-  Follow Up Time:     GISELA WHITAKER  [address] [place], [place] [address]  Phone: [phone]  Fax: [phone]  Follow Up Time:    Nicole Jessica  Plastic Surgery  [address] [place] [address], [address]  [place] [address]  Phone: [phone]  Fax: [phone]  Follow Up Time: 1 week    COLUMBA NG  [address] [place] [address]  Phone: ()-  Fax: ()-  Established Patient  Follow Up Time: 1 week    Donavon Booker  Infectious Disease  [address] [place] [address]  Phone: [phone]  Fax: ()-  Follow Up Time:     GISELA WHITAKER  [address] [place], [place] [address]  Phone: [phone]  Fax: [phone]  Follow Up Time:    Nicole Jessica  Plastic Surgery  [address] [place] [address], [address]  [place] [address]  Phone: [phone]  Fax: [phone]  Follow Up Time: 1 week    COLUMBA NG  [address] [place] [address]  Phone: ()-  Fax: ()-  Established Patient  Follow Up Time: 1 week    Donavon Booker  Infectious Disease  [address] [place] [address]  Phone: [phone]  Fax: ()-  Follow Up Time:     GISELA WHITAKER  [address] [place], [place] [address]  Phone: [phone]  Fax: [phone]  Follow Up Time:

## 2023-11-09 NOTE — DISCHARGE NOTE PROVIDER - HOSPITAL COURSE
Matthew Boyle presents today for   Chief Complaint   Patient presents with    Follow-up     6 month        Matthew Boyle preferred language for health care discussion is english/other. Is someone accompanying this pt? No     Is the patient using any DME equipment during OV? No     Depression Screening:  3 most recent PHQ Screens 2/1/2018   Little interest or pleasure in doing things Not at all   Feeling down, depressed, irritable, or hopeless Not at all   Total Score PHQ 2 0       Learning Assessment:  Learning Assessment 7/30/2015   PRIMARY LEARNER Patient   BARRIERS PRIMARY LEARNER NONE   CO-LEARNER CAREGIVER No   PRIMARY LANGUAGE ENGLISH   LEARNER PREFERENCE PRIMARY LISTENING   ANSWERED BY patient   RELATIONSHIP SELF       Abuse Screening:  Abuse Screening Questionnaire 2/1/2018   Do you ever feel afraid of your partner? N   Are you in a relationship with someone who physically or mentally threatens you? N   Is it safe for you to go home? Y       Fall Risk  Fall Risk Assessment, last 12 mths 2/1/2018   Able to walk? Yes   Fall in past 12 months? No       Pt currently taking Antiplatelet therapy? ASA     Coordination of Care:  1. Have you been to the ER, urgent care clinic since your last visit? Hospitalized since your last visit? No     2. Have you seen or consulted any other health care providers outside of the [address] [place] [address] since your last visit? Include any pap smears or colon screening.  No HPI: 52 y/o male with no PMHx now presenting to the ED with dog bite to left hand 11/06/2023. Dog belongs to patient  and is up to date on vaccinations. Patient stated his hand became swollen 11/07/2023 and is unable to make fist due to pain. Has not been on antibiotics as of yet. Denied fever chills, weakness, streaking, numbness, CP, SOB, abdominal pain, N/V/D. Right hand dominant.     Hospital Course:  L hand x-ray: No acute fracture or dislocation. Joint spaces are maintained. Soft tissue swelling about the hand. No radiopaque foreign body. Rounded lucency between the first and second metacarpals concerning for a small locule of air. IMPRESSION: No radiopaque foreign body. Small rounded lucency between the first and second metacarpals concerning for a small locule of air. No acute fracture or dislocation.  Seen by plastics, I&D done on 11/08/2023. Patient received 7 doses of IV Unasyn inpatient and one dose of IV Vancomycin.     Important Medication Changes and Reason:  Augmentin 875mg PO every 12 hours and Doxycycline PO every 12 hours for two weeks.   Culture and sensitivty sent, based on results antibiotics may need to be changed outpatient.     Active or Pending Issues Requiring Follow-up:  Must follow-up with plastic surgeon Dr. Trivedi in one week. Antibiotic regimen may be changed outpatient based on culture and sensitivities.     Advanced Directives:   [X] Full code  [ ] DNR  [ ] Hospice    Discharge Diagnoses:  Dog bite       HPI: 54 y/o male with no PMHx now presenting to the ED with dog bite to left hand 11/06/2023. Dog belongs to patient  and is up to date on vaccinations. Patient stated his hand became swollen 11/07/2023 and is unable to make fist due to pain. Has not been on antibiotics as of yet. Denied fever chills, weakness, streaking, numbness, CP, SOB, abdominal pain, N/V/D. Right hand dominant.     Hospital Course:  L hand x-ray: No acute fracture or dislocation. Joint spaces are maintained. Soft tissue swelling about the hand. No radiopaque foreign body. Rounded lucency between the first and second metacarpals concerning for a small locule of air. IMPRESSION: No radiopaque foreign body. Small rounded lucency between the first and second metacarpals concerning for a small locule of air. No acute fracture or dislocation.  Seen by plastics, I&D done on 11/08/2023. Patient received 7 doses of IV Unasyn inpatient and one dose of IV Vancomycin.     Important Medication Changes and Reason:  Augmentin 875mg PO every 12 hours and Doxycycline PO every 12 hours for two weeks.   Culture and sensitivty sent, based on results antibiotics may need to be changed outpatient.     Active or Pending Issues Requiring Follow-up:  Must follow-up with plastic surgeon Dr. Trivedi in one week. Antibiotic regimen may be changed outpatient based on culture and sensitivities.     Advanced Directives:   [X] Full code  [ ] DNR  [ ] Hospice    Discharge Diagnoses:  Dog bite       HPI: 52 y/o male with no PMHx now presenting to the ED with dog bite to left hand 11/06/2023. Dog belongs to patient  and is up to date on vaccinations. Patient stated his hand became swollen 11/07/2023 and is unable to make fist due to pain. Has not been on antibiotics as of yet. Denied fever chills, weakness, streaking, numbness, CP, SOB, abdominal pain, N/V/D. Right hand dominant.     Hospital Course:  L hand x-ray: No acute fracture or dislocation. Joint spaces are maintained. Soft tissue swelling about the hand. No radiopaque foreign body. Rounded lucency between the first and second metacarpals concerning for a small locule of air. IMPRESSION: No radiopaque foreign body. Small rounded lucency between the first and second metacarpals concerning for a small locule of air. No acute fracture or dislocation.  Seen by plastics, I&D done on 11/08/2023. Patient received 7 doses of IV Unasyn inpatient and one dose of IV Vancomycin.     Important Medication Changes and Reason:  Augmentin 875mg PO every 12 hours and Doxycycline PO every 12 hours for 10 days.   Culture and sensitivty sent, based on results antibiotics may need to be changed outpatient.     Active or Pending Issues Requiring Follow-up:  Must follow-up with plastic surgeon Dr. Trivedi in one week. Antibiotic regimen may be changed outpatient based on culture and sensitivities.     Advanced Directives:   [X] Full code  [ ] DNR  [ ] Hospice    Discharge Diagnoses:  Dog bite    Discharge and medication reconciliation discussed with Dr. Gonzalez, patient has been cleared for discharge at this time.    HPI: 54 y/o male with no PMHx now presenting to the ED with dog bite to left hand 11/06/2023. Dog belongs to patient  and is up to date on vaccinations. Patient stated his hand became swollen 11/07/2023 and is unable to make fist due to pain. Has not been on antibiotics as of yet. Denied fever chills, weakness, streaking, numbness, CP, SOB, abdominal pain, N/V/D. Right hand dominant.     Hospital Course:  L hand x-ray: No acute fracture or dislocation. Joint spaces are maintained. Soft tissue swelling about the hand. No radiopaque foreign body. Rounded lucency between the first and second metacarpals concerning for a small locule of air. IMPRESSION: No radiopaque foreign body. Small rounded lucency between the first and second metacarpals concerning for a small locule of air. No acute fracture or dislocation.  Seen by plastics, I&D done on 11/08/2023. Patient received 7 doses of IV Unasyn inpatient and one dose of IV Vancomycin.     Important Medication Changes and Reason:  Augmentin 875mg PO every 12 hours and Doxycycline PO every 12 hours for 10 days.   Culture and sensitivity sent, based on results antibiotics may need to be changed outpatient.     Active or Pending Issues Requiring Follow-up:  Must follow-up with plastic surgeon Dr. Trivedi in one week. Antibiotic regimen may be changed outpatient based on culture and sensitivities.   Follow up with PCP outpatient  Follow up L hand wound culture and sensitivities with ID outpatient     Advanced Directives:   [X] Full code  [ ] DNR  [ ] Hospice    Discharge Diagnoses:  Dog bite    Discharge and medication reconciliation discussed with Dr. Gonzalez, patient has been cleared for discharge at this time.    HPI: 52 y/o male with no PMHx now presenting to the ED with dog bite to left hand 11/06/2023. Dog belongs to patient  and is up to date on vaccinations. Patient stated his hand became swollen 11/07/2023 and is unable to make fist due to pain. Has not been on antibiotics as of yet. Denied fever chills, weakness, streaking, numbness, CP, SOB, abdominal pain, N/V/D. Right hand dominant.     Hospital Course:  L hand x-ray: No acute fracture or dislocation. Joint spaces are maintained. Soft tissue swelling about the hand. No radiopaque foreign body. Rounded lucency between the first and second metacarpals concerning for a small locule of air. IMPRESSION: No radiopaque foreign body. Small rounded lucency between the first and second metacarpals concerning for a small locule of air. No acute fracture or dislocation.  Seen by plastics, I&D done on 11/08/2023. Patient received 7 doses of IV Unasyn inpatient and one dose of IV Vancomycin.     Important Medication Changes and Reason:  Augmentin 875mg PO every 12 hours and Doxycycline PO every 12 hours for 10 days.   Culture and sensitivity sent, based on results antibiotics may need to be changed outpatient.     Active or Pending Issues Requiring Follow-up:  Must follow-up with plastic surgeon Dr. Trivedi in one week. Antibiotic regimen may be changed outpatient based on culture and sensitivities.   Follow up with PCP outpatient  Follow up L hand wound culture and sensitivities with ID outpatient     Advanced Directives:   [X] Full code  [ ] DNR  [ ] Hospice    Discharge Diagnoses:  Dog bite    Discharge and medication reconciliation discussed with Dr. Gonzalez, patient has been cleared for discharge at this time.

## 2023-11-09 NOTE — PROGRESS NOTE ADULT - SUBJECTIVE AND OBJECTIVE BOX
DATE OF SERVICE: 11-09-23 @ 11:26    Patient is a 53y old  Male who presents with a chief complaint of dog bite (08 Nov 2023 13:51)      SUBJECTIVE / OVERNIGHT EVENTS:  No chest pain. No shortness of breath. No complaints. No events overnight. n    MEDICATIONS  (STANDING):  ampicillin/sulbactam  IVPB 3 Gram(s) IV Intermittent every 6 hours    MEDICATIONS  (PRN):      Vital Signs Last 24 Hrs  T(C): 36.9 (09 Nov 2023 05:27), Max: 37.6 (08 Nov 2023 21:00)  T(F): 98.5 (09 Nov 2023 05:27), Max: 99.7 (08 Nov 2023 21:00)  HR: 80 (09 Nov 2023 05:27) (78 - 99)  BP: 129/81 (09 Nov 2023 05:27) (119/76 - 140/84)  BP(mean): --  RR: 18 (09 Nov 2023 05:27) (18 - 18)  SpO2: 97% (09 Nov 2023 05:27) (96% - 98%)    Parameters below as of 09 Nov 2023 05:27  Patient On (Oxygen Delivery Method): room air      CAPILLARY BLOOD GLUCOSE        I&O's Summary    08 Nov 2023 07:01  -  09 Nov 2023 07:00  --------------------------------------------------------  IN: 350 mL / OUT: 0 mL / NET: 350 mL        PHYSICAL EXAM:  GENERAL: NAD, well-developed  HEAD:  Atraumatic, Normocephalic  EYES: EOMI, PERRLA, conjunctiva and sclera clear  NECK: Supple, No JVD  CHEST/LUNG: Clear to auscultation bilaterally; No wheeze  HEART: Regular rate and rhythm; No murmurs, rubs, or gallops  ABDOMEN: Soft, Nontender, Nondistended; Bowel sounds present  EXTREMITIES:  2+ Peripheral Pulses, No clubbing, cyanosis, or edema ,left hand swelling  PSYCH: AAOx3  NEUROLOGY: non-focal  SKIN: No rashes or lesions    LABS:                        14.7   7.12  )-----------( 249      ( 07 Nov 2023 22:15 )             44.9     11-07    138  |  104  |  16  ----------------------------<  100<H>  4.6   |  21<L>  |  0.76    Ca    9.1      07 Nov 2023 22:15    TPro  7.7  /  Alb  4.2  /  TBili  0.3  /  DBili  x   /  AST  31  /  ALT  33  /  AlkPhos  122<H>  11-07          Urinalysis Basic - ( 07 Nov 2023 22:15 )    Color: x / Appearance: x / SG: x / pH: x  Gluc: 100 mg/dL / Ketone: x  / Bili: x / Urobili: x   Blood: x / Protein: x / Nitrite: x   Leuk Esterase: x / RBC: x / WBC x   Sq Epi: x / Non Sq Epi: x / Bacteria: x        RADIOLOGY & ADDITIONAL TESTS:    Imaging Personally Reviewed:    Consultant(s) Notes Reviewed:      Care Discussed with Consultants/Other Providers:

## 2023-11-09 NOTE — PROGRESS NOTE ADULT - ASSESSMENT
54 y/o male with no PMHx now presenting to the ED with dog bite to left hand yesterday. Dog belongs to patient  and is up to date on vaccinations. Patient stated his hand became swollen today and is unable to make fist due to pain. Has not been on antibiotics as of yet. Denied fever chills, weakness, streaking, numbness, CP, SOB, abdominal pain, N/V/D. Right hand dominant    eft hand infection 2/2 dog bite  - s/p I&D by elen  - abx as per ID  - Augmentin 875mg PO Q12H plus Doxycycline 100mg PO q12H x 7 days  - pain control  -  discharge today  - follow up with plastics Dr Jessica next week    54 y/o male with no PMHx now presenting to the ED with dog bite to left hand yesterday. Dog belongs to patient  and is up to date on vaccinations. Patient stated his hand became swollen today and is unable to make fist due to pain. Has not been on antibiotics as of yet. Denied fever chills, weakness, streaking, numbness, CP, SOB, abdominal pain, N/V/D. Right hand dominant    eft hand infection 2/2 dog bite  - s/p I&D by elen  - abx as per ID  - Augmentin 875mg PO Q12H plus Doxycycline 100mg PO q12H x 14 days  - pain control  -  discharge today  - follow up with plastics Dr Jessica next week    54 y/o male with no PMHx now presenting to the ED with dog bite to left hand yesterday. Dog belongs to patient  and is up to date on vaccinations. Patient stated his hand became swollen today and is unable to make fist due to pain. Has not been on antibiotics as of yet. Denied fever chills, weakness, streaking, numbness, CP, SOB, abdominal pain, N/V/D. Right hand dominant    eft hand infection 2/2 dog bite  - s/p I&D by elen  - abx as per ID  - Augmentin 875mg PO Q12H plus Doxycycline 100mg PO q12H x 10days  - pain control  -  discharge today  - follow up with plastics Dr Jessica next week

## 2023-11-09 NOTE — PROGRESS NOTE ADULT - ASSESSMENT
Semi-provoked dog bite  Cellulitis/abscess L hand  S/P I&D today  Patient states tetanus UTD, family dog UTD in shots not behaving abnormally and able to be monitored    11/9: Improved. On adequate empiric therapy.     Suggestions--  Local care per plastic  Augmentin 875mg PO Q12H plus Doxycycline 100mg PO q12H x10 days reasonable if discharged before sensi are available from culture  D/W Dr. Gonzalez  Thank you for the courtesy of this referral.  Please recall MYKEL Booker MD  Attending Physician  [facility]  Division of Infectious Diseases  [phone]

## 2023-11-09 NOTE — DISCHARGE NOTE NURSING/CASE MANAGEMENT/SOCIAL WORK - NSDCPEFALRISK_GEN_ALL_CORE
For information on Fall & Injury Prevention, visit: [email or web address] OR  [email or web address] OR  https://www.cdc.gov/steadi/patient.html

## 2023-11-09 NOTE — DISCHARGE NOTE PROVIDER - NSDCFUADDAPPT_GEN_ALL_CORE_FT
APPTS ARE READY TO BE MADE: [X] YES    Best Family or Patient Contact (if needed):    Additional Information about above appointments (if needed):    1: Follow-up with Dr. Trivedi in one week for re-evaluation and antibiotics may need to be adjusted based on culture and sensitivity.   2:   3:     Other comments or requests:    APPTS ARE READY TO BE MADE: [X] YES    Best Family or Patient Contact (if needed):    Additional Information about above appointments (if needed):    1: Follow-up with Dr. Trivedi in one week for re-evaluation and antibiotics may need to be adjusted based on culture and sensitivity.   2: Follow up with PCP outpatient  3: Can follow up L hand wound culture/sensitivty results with ID Dr. Booker outpatient    Other comments or requests:    APPTS ARE READY TO BE MADE: [X] YES    Best Family or Patient Contact (if needed):    Additional Information about above appointments (if needed):    1: Follow-up with Dr. Trivedi in one week for re-evaluation and antibiotics may need to be adjusted based on culture and sensitivity.   2: Follow up with PCP outpatient  3: Can follow up L hand wound culture/sensitivty results with ID Dr. Booker outpatient    Other comments or requests:     Patient was previously scheduled to see Dr. Trivedi post discharge but could not recount details. Validated they discussed medication management and if continuing care would be needed.    Patient declined scheduling assistance as they do find it necessary to attend follow ups.

## 2023-11-09 NOTE — PROGRESS NOTE ADULT - SUBJECTIVE AND OBJECTIVE BOX
[facility]  Division of Infectious Diseases  [phone]    Name: BG JENSEN  Age: 53y  Gender: Male  MRN: [id number]    Interval History--  Notes reviewed.     Past Medical History--  No pertinent past medical history    No significant past surgical history        For details regarding the patient's social history, family history, and other miscellaneous elements, please refer the initial infectious diseases consultation and/or the admitting history and physical examination for this admission.    Allergies    No Known Allergies    Intolerances        Medications--  Antibiotics:  ampicillin/sulbactam  IVPB 3 Gram(s) IV Intermittent every 6 hours    Immunologic:    Other:      Review of Systems--  A 10-point review of systems was obtained.     Pertinent positives and negatives--  Constitutional: No fevers. No Chills. No Rigors.   Cardiovascular: No chest pain. No palpitations.  Respiratory: No shortness of breath. No cough.  Gastrointestinal: No nausea or vomiting. No diarrhea or constipation.   Psychiatric: Pleasant. Appropriate affect.    Review of systems otherwise negative except as previously noted.    Physical Examination--  T(C): 36.9 (11-09-23 @ 05:27), Max: 37.6 (11-08-23 @ 21:00)  HR: 80 (11-09-23 @ 05:27) (78 - 99)  BP: 129/81 (11-09-23 @ 05:27) (119/76 - 140/84)  RR: 18 (11-09-23 @ 05:27) (18 - 18)  SpO2: 97% (11-09-23 @ 05:27) (96% - 98%)  Wt(kg): --  General: Nontoxic appearing man in no acute distress   Ext: Involved extremity: Left Upper--   Proximal lymphadenopathy: absent.   Lymphangitis: absent.   Tenderness: mild  Calor: minimal  Edema: 1+, imprpved  Erythema: present       Intensity: mild       Extent: hand only       Blanching: yes  Crepitus: absent.   Fluctuance: absent.   Devitalized tissue: absent.   Anesthesia: absent.   Bullae: absent.   Ulceration: absent.   Drainage: I&D site dried blood  Odor: absent.   Pulses: 2+  ROM: limited by edema      Laboratory Studies--  CBC                        14.7   7.12  )-----------( 249      ( 07 Nov 2023 22:15 )             44.9       Chemistries  11-07    138  |  104  |  16  ----------------------------<  100<H>  4.6   |  21<L>  |  0.76    Ca    9.1      07 Nov 2023 22:15    TPro  7.7  /  Alb  4.2  /  TBili  0.3  /  DBili  x   /  AST  31  /  ALT  33  /  AlkPhos  122<H>  11-07      Culture Data

## 2023-11-09 NOTE — DISCHARGE NOTE PROVIDER - PROVIDER TOKENS
PROVIDER:[TOKEN:[[id number]],FOLLOWUP:[1 week]],PROVIDER:[TOKEN:[[id number]],FOLLOWUP:[1 week],ESTABLISHEDPATIENT:[T]] PROVIDER:[TOKEN:[[id number]],FOLLOWUP:[1 week]],PROVIDER:[TOKEN:[[id number]],FOLLOWUP:[1 week],ESTABLISHEDPATIENT:[T]] PROVIDER:[TOKEN:[[id number]],FOLLOWUP:[1 week]],PROVIDER:[TOKEN:[[id number]],FOLLOWUP:[1 week],ESTABLISHEDPATIENT:[T]] PROVIDER:[TOKEN:[[id number]],FOLLOWUP:[1 week]],PROVIDER:[TOKEN:[[id number]],FOLLOWUP:[1 week],ESTABLISHEDPATIENT:[T]],PROVIDER:[TOKEN:[[id number]]],PROVIDER:[TOKEN:[[id number]]] PROVIDER:[TOKEN:[[id number]],FOLLOWUP:[1 week]],PROVIDER:[TOKEN:[[id number]],FOLLOWUP:[1 week],ESTABLISHEDPATIENT:[T]],PROVIDER:[TOKEN:[[id number]]],PROVIDER:[TOKEN:[[id number]]] PROVIDER:[TOKEN:[[id number]],FOLLOWUP:[1 week]],PROVIDER:[TOKEN:[[id number]],FOLLOWUP:[1 week],ESTABLISHEDPATIENT:[T]],PROVIDER:[TOKEN:[[id number]]],PROVIDER:[TOKEN:[[id number]]]

## 2023-11-10 LAB
CULTURE RESULTS: ABNORMAL
CULTURE RESULTS: ABNORMAL
SPECIMEN SOURCE: SIGNIFICANT CHANGE UP
SPECIMEN SOURCE: SIGNIFICANT CHANGE UP

## 2023-12-04 NOTE — CHART NOTE - NSCHARTNOTEFT_GEN_A_CORE
Patient's mother ([phone]) advised she will give patient our number but advised we should call him directly. Left message for patient on [phone] Patient's mother ([phone]) advised she will give patient our number but advised we should call him directly. Left message for patient on [phone]

## 2024-12-24 PROBLEM — F10.90 ALCOHOL USE: Status: INACTIVE | Noted: 2019-08-19

## 2025-03-26 ENCOUNTER — APPOINTMENT (OUTPATIENT)
Dept: PULMONOLOGY | Facility: CLINIC | Age: 55
End: 2025-03-26

## 2025-04-01 ENCOUNTER — APPOINTMENT (OUTPATIENT)
Dept: PULMONOLOGY | Facility: CLINIC | Age: 55
End: 2025-04-01

## 2025-04-01 ENCOUNTER — NON-APPOINTMENT (OUTPATIENT)
Age: 55
End: 2025-04-01

## 2025-04-01 VITALS
WEIGHT: 151 LBS | TEMPERATURE: 98.4 F | HEART RATE: 73 BPM | SYSTOLIC BLOOD PRESSURE: 138 MMHG | OXYGEN SATURATION: 97 % | BODY MASS INDEX: 23.7 KG/M2 | HEIGHT: 67 IN | DIASTOLIC BLOOD PRESSURE: 64 MMHG